# Patient Record
Sex: FEMALE | Race: WHITE | Employment: UNEMPLOYED | URBAN - METROPOLITAN AREA
[De-identification: names, ages, dates, MRNs, and addresses within clinical notes are randomized per-mention and may not be internally consistent; named-entity substitution may affect disease eponyms.]

---

## 2019-03-07 LAB
BASOPHILS # BLD: 0.8 % (ref 0.1–1.2)
BASOPHILS ABSOLUTE: 0.08 10*3/UL (ref 0.01–0.07)
EOSINOPHIL # BLD: 1.8 % (ref 0–8.1)
EOSINOPHILS ABSOLUTE: 0.18 10*3/UL (ref 0.04–0.5)
ERYTHROCYTE [DISTWIDTH] IN BLOOD BY AUTOMATED COUNT: 16.6 % (ref 12–15.4)
ERYTHROCYTE [DISTWIDTH] IN BLOOD BY AUTOMATED COUNT: 56.7 FL (ref 39.3–48.6)
HCT VFR BLD CALC: 37.9 % (ref 36.5–46.6)
HEMOGLOBIN: 11.7 G/DL (ref 11.8–15.3)
IMMATURE GRANS (ABS): 0.08 10*3/UL (ref 0–0.21)
IMMATURE GRANULOCYTES: 0.8 %
LYMPHOCYTES # BLD: 11.3 % (ref 15.7–50.5)
LYMPHOCYTES ABSOLUTE: 1.1 10*3/UL (ref 0.4–2.84)
MCH RBC QN AUTO: 28.7 PG (ref 27.5–33)
MCHC RBC AUTO-ENTMCNC: 30.9 G/DL (ref 30.1–35)
MCV RBC AUTO: 92.9 FL (ref 85.4–100)
MONOCYTES # BLD: 7.4 % (ref 4.8–12.7)
MONOCYTES ABSOLUTE: 0.72 10*3/UL (ref 0.25–0.83)
NEUTROPHILS ABSOLUTE: 7.6 10*3/UL (ref 1.95–6.85)
NEUTROPHILS: 77.9 % (ref 36.8–73.2)
NUCLEATED RBCS: 0 /100{WBCS}
PLATELET # BLD: 202 10*3/UL (ref 155–404)
PMV BLD AUTO: 11 FL (ref 9.9–12.1)
RBC: 4.08 10*6/UL (ref 3.85–5.1)
RBCS COUNTED: 0 10*3/UL
WBC: 9.8 10*3/UL (ref 4.4–9.9)

## 2020-08-18 LAB — SURGICAL PATHOLOGY REPORT: NORMAL

## 2021-12-16 ENCOUNTER — APPOINTMENT (OUTPATIENT)
Dept: CT IMAGING | Age: 81
DRG: 086 | End: 2021-12-16
Attending: STUDENT IN AN ORGANIZED HEALTH CARE EDUCATION/TRAINING PROGRAM
Payer: MEDICARE

## 2021-12-16 ENCOUNTER — HOSPITAL ENCOUNTER (INPATIENT)
Age: 81
LOS: 3 days | Discharge: HOME | DRG: 086 | End: 2021-12-19
Attending: STUDENT IN AN ORGANIZED HEALTH CARE EDUCATION/TRAINING PROGRAM | Admitting: ANESTHESIOLOGY
Payer: MEDICARE

## 2021-12-16 DIAGNOSIS — S06.5XAA SUBDURAL HEMATOMA: Primary | ICD-10-CM

## 2021-12-16 DIAGNOSIS — E11.65 CONTROLLED TYPE 2 DIABETES MELLITUS WITH HYPERGLYCEMIA, WITH LONG-TERM CURRENT USE OF INSULIN (HCC): ICD-10-CM

## 2021-12-16 DIAGNOSIS — S01.81XA LACERATION OF FOREHEAD, INITIAL ENCOUNTER: ICD-10-CM

## 2021-12-16 DIAGNOSIS — Z79.4 CONTROLLED TYPE 2 DIABETES MELLITUS WITH HYPERGLYCEMIA, WITH LONG-TERM CURRENT USE OF INSULIN (HCC): ICD-10-CM

## 2021-12-16 PROBLEM — I62.00 SUBDURAL BLEEDING (HCC): Status: ACTIVE | Noted: 2021-12-16

## 2021-12-16 LAB
ALBUMIN SERPL-MCNC: 3.5 G/DL (ref 3.5–5)
ALBUMIN/GLOB SERPL: 0.8 {RATIO} (ref 1.1–2.2)
ALP SERPL-CCNC: 125 U/L (ref 45–117)
ALT SERPL-CCNC: 41 U/L (ref 12–78)
ANION GAP SERPL CALC-SCNC: 13 MMOL/L (ref 5–15)
AST SERPL-CCNC: 38 U/L (ref 15–37)
BASOPHILS # BLD: 0.1 K/UL (ref 0–0.1)
BASOPHILS NFR BLD: 1 % (ref 0–1)
BILIRUB SERPL-MCNC: 0.4 MG/DL (ref 0.2–1)
BUN SERPL-MCNC: 58 MG/DL (ref 6–20)
BUN/CREAT SERPL: 27 (ref 12–20)
CALCIUM SERPL-MCNC: 10 MG/DL (ref 8.5–10.1)
CHLORIDE SERPL-SCNC: 99 MMOL/L (ref 97–108)
CO2 SERPL-SCNC: 26 MMOL/L (ref 21–32)
CREAT SERPL-MCNC: 2.18 MG/DL (ref 0.55–1.02)
DIFFERENTIAL METHOD BLD: ABNORMAL
EOSINOPHIL # BLD: 0.2 K/UL (ref 0–0.4)
EOSINOPHIL NFR BLD: 2 % (ref 0–7)
ERYTHROCYTE [DISTWIDTH] IN BLOOD BY AUTOMATED COUNT: 17.6 % (ref 11.5–14.5)
GLOBULIN SER CALC-MCNC: 4.2 G/DL (ref 2–4)
GLUCOSE SERPL-MCNC: 197 MG/DL (ref 65–100)
HCT VFR BLD AUTO: 41.6 % (ref 35–47)
HGB BLD-MCNC: 13.5 G/DL (ref 11.5–16)
IMM GRANULOCYTES # BLD AUTO: 0.1 K/UL (ref 0–0.04)
IMM GRANULOCYTES NFR BLD AUTO: 1 % (ref 0–0.5)
INR PPP: 1 (ref 0.9–1.1)
LYMPHOCYTES # BLD: 1.5 K/UL (ref 0.8–3.5)
LYMPHOCYTES NFR BLD: 14 % (ref 12–49)
MCH RBC QN AUTO: 29.5 PG (ref 26–34)
MCHC RBC AUTO-ENTMCNC: 32.5 G/DL (ref 30–36.5)
MCV RBC AUTO: 90.8 FL (ref 80–99)
MONOCYTES # BLD: 1.2 K/UL (ref 0–1)
MONOCYTES NFR BLD: 11 % (ref 5–13)
NEUTS SEG # BLD: 7.6 K/UL (ref 1.8–8)
NEUTS SEG NFR BLD: 71 % (ref 32–75)
NRBC # BLD: 0 K/UL (ref 0–0.01)
NRBC BLD-RTO: 0 PER 100 WBC
PLATELET # BLD AUTO: 201 K/UL (ref 150–400)
PMV BLD AUTO: 11.1 FL (ref 8.9–12.9)
POTASSIUM SERPL-SCNC: 3.1 MMOL/L (ref 3.5–5.1)
PROT SERPL-MCNC: 7.7 G/DL (ref 6.4–8.2)
PROTHROMBIN TIME: 10.3 SEC (ref 9–11.1)
RBC # BLD AUTO: 4.58 M/UL (ref 3.8–5.2)
SODIUM SERPL-SCNC: 138 MMOL/L (ref 136–145)
WBC # BLD AUTO: 10.7 K/UL (ref 3.6–11)

## 2021-12-16 PROCEDURE — 80053 COMPREHEN METABOLIC PANEL: CPT

## 2021-12-16 PROCEDURE — 74011250636 HC RX REV CODE- 250/636: Performed by: STUDENT IN AN ORGANIZED HEALTH CARE EDUCATION/TRAINING PROGRAM

## 2021-12-16 PROCEDURE — 65610000006 HC RM INTENSIVE CARE

## 2021-12-16 PROCEDURE — 85610 PROTHROMBIN TIME: CPT

## 2021-12-16 PROCEDURE — 72125 CT NECK SPINE W/O DYE: CPT

## 2021-12-16 PROCEDURE — 99285 EMERGENCY DEPT VISIT HI MDM: CPT

## 2021-12-16 PROCEDURE — 70450 CT HEAD/BRAIN W/O DYE: CPT

## 2021-12-16 PROCEDURE — 0HQ1XZZ REPAIR FACE SKIN, EXTERNAL APPROACH: ICD-10-PCS | Performed by: INTERNAL MEDICINE

## 2021-12-16 PROCEDURE — 85025 COMPLETE CBC W/AUTO DIFF WBC: CPT

## 2021-12-16 PROCEDURE — 74011000250 HC RX REV CODE- 250: Performed by: STUDENT IN AN ORGANIZED HEALTH CARE EDUCATION/TRAINING PROGRAM

## 2021-12-16 PROCEDURE — 36415 COLL VENOUS BLD VENIPUNCTURE: CPT

## 2021-12-16 PROCEDURE — 75810000293 HC SIMP/SUPERF WND  RPR

## 2021-12-16 RX ORDER — TORSEMIDE 100 MG/1
100 TABLET ORAL DAILY
COMMUNITY

## 2021-12-16 RX ORDER — ATORVASTATIN CALCIUM 40 MG/1
40 TABLET, FILM COATED ORAL DAILY
COMMUNITY

## 2021-12-16 RX ORDER — LIDOCAINE HYDROCHLORIDE AND EPINEPHRINE 20; 10 MG/ML; UG/ML
10 INJECTION, SOLUTION INFILTRATION; PERINEURAL ONCE
Status: COMPLETED | OUTPATIENT
Start: 2021-12-16 | End: 2021-12-16

## 2021-12-16 RX ORDER — HYDROCHLOROTHIAZIDE 50 MG/1
50 TABLET ORAL DAILY
COMMUNITY

## 2021-12-16 RX ORDER — DOCUSATE SODIUM 100 MG/1
100 CAPSULE, LIQUID FILLED ORAL 2 TIMES DAILY
COMMUNITY

## 2021-12-16 RX ORDER — SPIRONOLACTONE 25 MG/1
25 TABLET ORAL DAILY
COMMUNITY

## 2021-12-16 RX ORDER — SUCRALFATE 1 G/1
1 TABLET ORAL 3 TIMES DAILY
COMMUNITY

## 2021-12-16 RX ORDER — LEVOTHYROXINE SODIUM 200 UG/1
200 TABLET ORAL
COMMUNITY

## 2021-12-16 RX ORDER — GUAIFENESIN 1200 MG
TABLET, EXTENDED RELEASE 12 HR ORAL
COMMUNITY

## 2021-12-16 RX ORDER — PANTOPRAZOLE SODIUM 40 MG/1
40 TABLET, DELAYED RELEASE ORAL 2 TIMES DAILY
COMMUNITY

## 2021-12-16 RX ORDER — ASPIRIN 81 MG/1
81 TABLET ORAL DAILY
COMMUNITY
End: 2021-12-19

## 2021-12-16 RX ORDER — POTASSIUM CHLORIDE 1500 MG/1
60 TABLET, FILM COATED, EXTENDED RELEASE ORAL
COMMUNITY

## 2021-12-16 RX ORDER — LANOLIN ALCOHOL/MO/W.PET/CERES
1000 CREAM (GRAM) TOPICAL DAILY
Status: ON HOLD | COMMUNITY
End: 2021-12-17 | Stop reason: ALTCHOICE

## 2021-12-16 RX ORDER — FLAVOXATE HYDROCHLORIDE 100 MG/1
100 TABLET ORAL 3 TIMES DAILY
COMMUNITY

## 2021-12-16 RX ORDER — GABAPENTIN 300 MG/1
300 CAPSULE ORAL EVERY MORNING
COMMUNITY

## 2021-12-16 RX ADMIN — Medication 2 ML: at 20:26

## 2021-12-16 RX ADMIN — NICARDIPINE HYDROCHLORIDE 5 MG/HR: 25 INJECTION, SOLUTION INTRAVENOUS at 22:30

## 2021-12-16 RX ADMIN — LIDOCAINE HYDROCHLORIDE,EPINEPHRINE BITARTRATE 200 MG: 20; .01 INJECTION, SOLUTION INFILTRATION; PERINEURAL at 22:51

## 2021-12-17 ENCOUNTER — APPOINTMENT (OUTPATIENT)
Dept: CT IMAGING | Age: 81
DRG: 086 | End: 2021-12-17
Attending: NURSE PRACTITIONER
Payer: MEDICARE

## 2021-12-17 LAB
ANION GAP SERPL CALC-SCNC: 12 MMOL/L (ref 5–15)
ARTERIAL PATENCY WRIST A: POSITIVE
BASE EXCESS BLD CALC-SCNC: 1.5 MMOL/L
BDY SITE: ABNORMAL
BNP SERPL-MCNC: 512 PG/ML
BUN SERPL-MCNC: 57 MG/DL (ref 6–20)
BUN/CREAT SERPL: 27 (ref 12–20)
CALCIUM SERPL-MCNC: 9.4 MG/DL (ref 8.5–10.1)
CHLORIDE SERPL-SCNC: 101 MMOL/L (ref 97–108)
CO2 SERPL-SCNC: 23 MMOL/L (ref 21–32)
CREAT SERPL-MCNC: 2.08 MG/DL (ref 0.55–1.02)
EST. AVERAGE GLUCOSE BLD GHB EST-MCNC: 140 MG/DL
GAS FLOW.O2 O2 DELIVERY SYS: ABNORMAL L/MIN
GLUCOSE BLD STRIP.AUTO-MCNC: 177 MG/DL (ref 65–117)
GLUCOSE BLD STRIP.AUTO-MCNC: 190 MG/DL (ref 65–117)
GLUCOSE BLD STRIP.AUTO-MCNC: 196 MG/DL (ref 65–117)
GLUCOSE BLD STRIP.AUTO-MCNC: 212 MG/DL (ref 65–117)
GLUCOSE BLD STRIP.AUTO-MCNC: 232 MG/DL (ref 65–117)
GLUCOSE BLD STRIP.AUTO-MCNC: 272 MG/DL (ref 65–117)
GLUCOSE SERPL-MCNC: 206 MG/DL (ref 65–100)
HBA1C MFR BLD: 6.5 % (ref 4–5.6)
HCO3 BLD-SCNC: 26 MMOL/L (ref 22–26)
MAGNESIUM SERPL-MCNC: 1.9 MG/DL (ref 1.6–2.4)
PCO2 BLD: 39.7 MMHG (ref 35–45)
PH BLD: 7.42 [PH] (ref 7.35–7.45)
PHOSPHATE SERPL-MCNC: 3.9 MG/DL (ref 2.6–4.7)
PO2 BLD: 55 MMHG (ref 80–100)
POTASSIUM SERPL-SCNC: 3 MMOL/L (ref 3.5–5.1)
SAO2 % BLD: 88.8 % (ref 92–97)
SERVICE CMNT-IMP: ABNORMAL
SODIUM SERPL-SCNC: 136 MMOL/L (ref 136–145)
SPECIMEN TYPE: ABNORMAL
TROPONIN-HIGH SENSITIVITY: 21 NG/L (ref 0–51)
TSH SERPL DL<=0.05 MIU/L-ACNC: 0.43 UIU/ML (ref 0.36–3.74)

## 2021-12-17 PROCEDURE — 84443 ASSAY THYROID STIM HORMONE: CPT

## 2021-12-17 PROCEDURE — 74011250636 HC RX REV CODE- 250/636: Performed by: NURSE PRACTITIONER

## 2021-12-17 PROCEDURE — 36600 WITHDRAWAL OF ARTERIAL BLOOD: CPT

## 2021-12-17 PROCEDURE — 74011250637 HC RX REV CODE- 250/637: Performed by: INTERNAL MEDICINE

## 2021-12-17 PROCEDURE — 74011000258 HC RX REV CODE- 258: Performed by: NURSE PRACTITIONER

## 2021-12-17 PROCEDURE — 74011250636 HC RX REV CODE- 250/636: Performed by: STUDENT IN AN ORGANIZED HEALTH CARE EDUCATION/TRAINING PROGRAM

## 2021-12-17 PROCEDURE — 74011636637 HC RX REV CODE- 636/637: Performed by: NURSE PRACTITIONER

## 2021-12-17 PROCEDURE — 83036 HEMOGLOBIN GLYCOSYLATED A1C: CPT

## 2021-12-17 PROCEDURE — 82803 BLOOD GASES ANY COMBINATION: CPT

## 2021-12-17 PROCEDURE — 83880 ASSAY OF NATRIURETIC PEPTIDE: CPT

## 2021-12-17 PROCEDURE — 93005 ELECTROCARDIOGRAM TRACING: CPT

## 2021-12-17 PROCEDURE — 84100 ASSAY OF PHOSPHORUS: CPT

## 2021-12-17 PROCEDURE — 36415 COLL VENOUS BLD VENIPUNCTURE: CPT

## 2021-12-17 PROCEDURE — 83735 ASSAY OF MAGNESIUM: CPT

## 2021-12-17 PROCEDURE — 74011000250 HC RX REV CODE- 250: Performed by: NURSE PRACTITIONER

## 2021-12-17 PROCEDURE — 82962 GLUCOSE BLOOD TEST: CPT

## 2021-12-17 PROCEDURE — 84484 ASSAY OF TROPONIN QUANT: CPT

## 2021-12-17 PROCEDURE — 74011000250 HC RX REV CODE- 250: Performed by: STUDENT IN AN ORGANIZED HEALTH CARE EDUCATION/TRAINING PROGRAM

## 2021-12-17 PROCEDURE — 70450 CT HEAD/BRAIN W/O DYE: CPT

## 2021-12-17 PROCEDURE — 65660000001 HC RM ICU INTERMED STEPDOWN

## 2021-12-17 PROCEDURE — 74011250637 HC RX REV CODE- 250/637: Performed by: NURSE PRACTITIONER

## 2021-12-17 PROCEDURE — 99233 SBSQ HOSP IP/OBS HIGH 50: CPT | Performed by: CLINICAL NURSE SPECIALIST

## 2021-12-17 PROCEDURE — C9113 INJ PANTOPRAZOLE SODIUM, VIA: HCPCS | Performed by: NURSE PRACTITIONER

## 2021-12-17 PROCEDURE — 80048 BASIC METABOLIC PNL TOTAL CA: CPT

## 2021-12-17 RX ORDER — HYDROCHLOROTHIAZIDE 25 MG/1
50 TABLET ORAL DAILY
Status: DISCONTINUED | OUTPATIENT
Start: 2021-12-17 | End: 2021-12-19 | Stop reason: HOSPADM

## 2021-12-17 RX ORDER — LEVOTHYROXINE SODIUM 200 UG/1
200 TABLET ORAL
Status: DISCONTINUED | OUTPATIENT
Start: 2021-12-17 | End: 2021-12-19 | Stop reason: HOSPADM

## 2021-12-17 RX ORDER — GABAPENTIN 300 MG/1
300 CAPSULE ORAL EVERY MORNING
Status: DISCONTINUED | OUTPATIENT
Start: 2021-12-17 | End: 2021-12-19 | Stop reason: HOSPADM

## 2021-12-17 RX ORDER — INSULIN LISPRO 100 [IU]/ML
INJECTION, SOLUTION INTRAVENOUS; SUBCUTANEOUS
Status: DISCONTINUED | OUTPATIENT
Start: 2021-12-17 | End: 2021-12-18

## 2021-12-17 RX ORDER — ACETAMINOPHEN 650 MG/1
650 SUPPOSITORY RECTAL
Status: DISCONTINUED | OUTPATIENT
Start: 2021-12-17 | End: 2021-12-19 | Stop reason: HOSPADM

## 2021-12-17 RX ORDER — SUCRALFATE 1 G/1
1 TABLET ORAL 3 TIMES DAILY
Status: DISCONTINUED | OUTPATIENT
Start: 2021-12-17 | End: 2021-12-19 | Stop reason: HOSPADM

## 2021-12-17 RX ORDER — DOCUSATE SODIUM 100 MG/1
100 CAPSULE, LIQUID FILLED ORAL 2 TIMES DAILY
Status: DISCONTINUED | OUTPATIENT
Start: 2021-12-17 | End: 2021-12-19 | Stop reason: HOSPADM

## 2021-12-17 RX ORDER — SODIUM CHLORIDE 0.9 % (FLUSH) 0.9 %
5-40 SYRINGE (ML) INJECTION EVERY 8 HOURS
Status: DISCONTINUED | OUTPATIENT
Start: 2021-12-17 | End: 2021-12-19 | Stop reason: HOSPADM

## 2021-12-17 RX ORDER — INSULIN GLARGINE 100 [IU]/ML
30 INJECTION, SOLUTION SUBCUTANEOUS DAILY
Status: DISCONTINUED | OUTPATIENT
Start: 2021-12-18 | End: 2021-12-18

## 2021-12-17 RX ORDER — PANTOPRAZOLE SODIUM 40 MG/1
40 TABLET, DELAYED RELEASE ORAL
Status: DISCONTINUED | OUTPATIENT
Start: 2021-12-18 | End: 2021-12-18

## 2021-12-17 RX ORDER — INSULIN GLARGINE 100 [IU]/ML
40 INJECTION, SOLUTION SUBCUTANEOUS DAILY
Status: DISCONTINUED | OUTPATIENT
Start: 2021-12-18 | End: 2021-12-17

## 2021-12-17 RX ORDER — GABAPENTIN 600 MG/1
600 TABLET ORAL 3 TIMES DAILY
Status: DISCONTINUED | OUTPATIENT
Start: 2021-12-17 | End: 2021-12-17 | Stop reason: SDUPTHER

## 2021-12-17 RX ORDER — ACETAMINOPHEN 325 MG/1
650 TABLET ORAL
Status: DISCONTINUED | OUTPATIENT
Start: 2021-12-17 | End: 2021-12-19 | Stop reason: HOSPADM

## 2021-12-17 RX ORDER — SULFAMETHOXAZOLE AND TRIMETHOPRIM 800; 160 MG/1; MG/1
1 TABLET ORAL DAILY
COMMUNITY

## 2021-12-17 RX ORDER — DEXTROSE 50 % IN WATER (D50W) INTRAVENOUS SYRINGE
12.5-25 AS NEEDED
Status: DISCONTINUED | OUTPATIENT
Start: 2021-12-17 | End: 2021-12-19 | Stop reason: HOSPADM

## 2021-12-17 RX ORDER — POTASSIUM CHLORIDE 750 MG/1
40 TABLET, FILM COATED, EXTENDED RELEASE ORAL 2 TIMES DAILY
Status: DISCONTINUED | OUTPATIENT
Start: 2021-12-17 | End: 2021-12-18

## 2021-12-17 RX ORDER — LANOLIN ALCOHOL/MO/W.PET/CERES
1000 CREAM (GRAM) TOPICAL DAILY
Status: DISCONTINUED | OUTPATIENT
Start: 2021-12-17 | End: 2021-12-19 | Stop reason: HOSPADM

## 2021-12-17 RX ORDER — ONDANSETRON 4 MG/1
4 TABLET, ORALLY DISINTEGRATING ORAL
Status: DISCONTINUED | OUTPATIENT
Start: 2021-12-17 | End: 2021-12-19 | Stop reason: HOSPADM

## 2021-12-17 RX ORDER — DEXTROSE 50 % IN WATER (D50W) INTRAVENOUS SYRINGE
25-50 AS NEEDED
Status: DISCONTINUED | OUTPATIENT
Start: 2021-12-17 | End: 2021-12-19 | Stop reason: HOSPADM

## 2021-12-17 RX ORDER — ATORVASTATIN CALCIUM 40 MG/1
40 TABLET, FILM COATED ORAL DAILY
Status: DISCONTINUED | OUTPATIENT
Start: 2021-12-17 | End: 2021-12-19 | Stop reason: HOSPADM

## 2021-12-17 RX ORDER — LEVETIRACETAM 250 MG/1
250 TABLET ORAL EVERY 12 HOURS
Status: DISCONTINUED | OUTPATIENT
Start: 2021-12-17 | End: 2021-12-19 | Stop reason: HOSPADM

## 2021-12-17 RX ORDER — POLYETHYLENE GLYCOL 3350 17 G/17G
17 POWDER, FOR SOLUTION ORAL DAILY PRN
Status: DISCONTINUED | OUTPATIENT
Start: 2021-12-17 | End: 2021-12-19 | Stop reason: HOSPADM

## 2021-12-17 RX ORDER — ONDANSETRON 2 MG/ML
4 INJECTION INTRAMUSCULAR; INTRAVENOUS
Status: DISCONTINUED | OUTPATIENT
Start: 2021-12-17 | End: 2021-12-19 | Stop reason: HOSPADM

## 2021-12-17 RX ORDER — SODIUM CHLORIDE 0.9 % (FLUSH) 0.9 %
5-40 SYRINGE (ML) INJECTION AS NEEDED
Status: DISCONTINUED | OUTPATIENT
Start: 2021-12-17 | End: 2021-12-19 | Stop reason: HOSPADM

## 2021-12-17 RX ORDER — GABAPENTIN 600 MG/1
600 TABLET ORAL EVERY EVENING
Status: DISCONTINUED | OUTPATIENT
Start: 2021-12-17 | End: 2021-12-19 | Stop reason: HOSPADM

## 2021-12-17 RX ORDER — MAGNESIUM SULFATE 100 %
4 CRYSTALS MISCELLANEOUS AS NEEDED
Status: DISCONTINUED | OUTPATIENT
Start: 2021-12-17 | End: 2021-12-19 | Stop reason: HOSPADM

## 2021-12-17 RX ORDER — GABAPENTIN 600 MG/1
600 TABLET ORAL EVERY EVENING
COMMUNITY
End: 2021-12-19

## 2021-12-17 RX ORDER — FOLIC ACID 1 MG/1
1 TABLET ORAL DAILY
COMMUNITY

## 2021-12-17 RX ORDER — INSULIN LISPRO 100 [IU]/ML
INJECTION, SOLUTION INTRAVENOUS; SUBCUTANEOUS EVERY 6 HOURS
Status: DISCONTINUED | OUTPATIENT
Start: 2021-12-17 | End: 2021-12-17

## 2021-12-17 RX ADMIN — Medication 10 ML: at 06:34

## 2021-12-17 RX ADMIN — GABAPENTIN 300 MG: 300 CAPSULE ORAL at 11:58

## 2021-12-17 RX ADMIN — Medication 10 ML: at 21:03

## 2021-12-17 RX ADMIN — POTASSIUM CHLORIDE 40 MEQ: 750 TABLET, FILM COATED, EXTENDED RELEASE ORAL at 21:02

## 2021-12-17 RX ADMIN — GABAPENTIN 600 MG: 600 TABLET, FILM COATED ORAL at 21:02

## 2021-12-17 RX ADMIN — Medication 3 UNITS: at 03:54

## 2021-12-17 RX ADMIN — Medication 2 UNITS: at 11:57

## 2021-12-17 RX ADMIN — Medication 10 ML: at 13:37

## 2021-12-17 RX ADMIN — LEVETIRACETAM 250 MG: 250 TABLET, FILM COATED ORAL at 17:19

## 2021-12-17 RX ADMIN — SODIUM CHLORIDE 40 MG: 9 INJECTION INTRAMUSCULAR; INTRAVENOUS; SUBCUTANEOUS at 03:00

## 2021-12-17 RX ADMIN — Medication 2 UNITS: at 06:41

## 2021-12-17 RX ADMIN — HYDROCHLOROTHIAZIDE 50 MG: 25 TABLET ORAL at 13:41

## 2021-12-17 RX ADMIN — Medication 10 ML: at 04:02

## 2021-12-17 RX ADMIN — SUCRALFATE 1 G: 1 TABLET ORAL at 21:01

## 2021-12-17 RX ADMIN — Medication 2 UNITS: at 21:08

## 2021-12-17 RX ADMIN — NICARDIPINE HYDROCHLORIDE 5 MG/HR: 25 INJECTION, SOLUTION INTRAVENOUS at 09:00

## 2021-12-17 RX ADMIN — ATORVASTATIN CALCIUM 40 MG: 40 TABLET, FILM COATED ORAL at 21:10

## 2021-12-17 RX ADMIN — DOCUSATE SODIUM 100 MG: 100 CAPSULE ORAL at 17:18

## 2021-12-17 RX ADMIN — POTASSIUM CHLORIDE 40 MEQ: 750 TABLET, FILM COATED, EXTENDED RELEASE ORAL at 13:41

## 2021-12-17 RX ADMIN — SUCRALFATE 1 G: 1 TABLET ORAL at 13:41

## 2021-12-17 RX ADMIN — NICARDIPINE HYDROCHLORIDE 5 MG/HR: 25 INJECTION, SOLUTION INTRAVENOUS at 04:24

## 2021-12-17 RX ADMIN — LEVOTHYROXINE SODIUM 200 MCG: 0.2 TABLET ORAL at 08:09

## 2021-12-17 RX ADMIN — LEVETIRACETAM 250 MG: 100 INJECTION, SOLUTION INTRAVENOUS at 05:03

## 2021-12-17 NOTE — ED TRIAGE NOTES
Patient arrives via EMS for a fall. Pt states she was walking across the floor when \"her shoes got stuck to the floor\" causing her to fall forward.  Laceration above right ey,  Bruising and swelling to right knee

## 2021-12-17 NOTE — H&P
[facility]    ICU TEAM History and Physical    Name: Teja Tay   : 1940   MRN: [id number]   Date: 2021      Progress Note: 2021      Reason for ICU Admission:  Subdural Hematoma     HPI:  This is a 47-year-old female patient with past medical history significant for atrial fibrillation status post ablation, diabetes, hypertension, COPD not on PPV, CKD, hypothyroidism, coronary artery disease, CABG, multiple orthopedic surgeries to include bilateral shoulder repair, bilateral hip and knee replacements, colon cancer, renal cancer status post left nephrectomy. Patient presented to short [facility] ED last evening after a witnessed mechanical fall. Per patient she was visiting a relative and walked across the kitchen when she tripped on her shoe and fell to the ground. She did not have LOC but complains of pain over the right side of her forehead. She was seen at [facility] where she received 11 sutures. Head CT scan done showed acute left parafalcine subdural hematoma without significant mass-effect. Neurosurgery was alerted and referred patient to be admitted to ICU with monitoring. CT C-spine was negative for acute findings. Patient arrived in ICU alert oriented x4. She is on a nicardipine drip to keep systolic blood pressures less than 140. She denies chest pain or pressure, headaches, palpitations, nausea or vomiting, fevers or chills, shortness of breath, abdominal pain, diarrhea or constipation. She has not had any recent falls and does not complain of any dizziness. Assessment:     ICU Problems:    1. Acute left parafalcine subdural hematoma  2. Atrial fibrillation status post ablation  3. Hypertension  4. Diabetes mellitus  5. Hypothyroidism  6. Chronic Kidney Disease     ICU Comprehensive Plan of Care:     Plans for this Shift:     1.  Neuro:   -Neurochecks every hour  Follow-up head CT scan  Neurosurgery consulted  Fall/seizure precautions  - IV Keppra BID Keep systolic blood pressures less than 140 mmHg  PT/OT      2. Pulm:   -No acute findings  Currently on room air  Keep oxygen saturation greater than 92%    3. Cardiac:  -Hold home aspirin in setting of subdural hematoma  Telemetry  EKG now   - Continue nicardipine gtt for SBP <140    4. Renal:  -Strict intake and output  - Cruz catheter with a leg bag, obtain UA    5. GI:   - Protonix for GERD   - Carafate  QID  - Passed bedside swallow     6. ID:   - No acute issues , Add UA     7. Endo:   - SSI Q 6 hours   - Verify  home insulin regimen   - Restart home levothyroxine   - Add TSH     8. Heme:  - Follow blood counts   - Continue home cyanocobalamin  daily         7. SBP Goal of: < 140 mmHg  8. Nicardipine (Cardene) - For above SBP/MAP goals  9. IVFs:  None   10. Transfusion Trigger (Hgb): <7 g/dL  11. Respiratory Goals:  a. Head of bed > 30 degrees  b. Aggressive bronchopulmonary hygiene  c. Incentive spirometry  12. Pulmonary toilet: Incentive Spirometry   13. SpO2 Goal: > 92%  14. Keep K>4; Mg>2   15. PT/OT: PT consulted and on board and OT consulted and on board   16. Discussed Plan of Care/Code Status: Do Not Resuscitate  17. Appreciate Consultants Input   18. Discussed Care Plan with Bedside RN  19. Documentation of Current Medications  20.  Rest of Plan Below:    F - Feeding:  N/A   A - Analgesia: Acetaminophen  S - Sedation: None  T - DVT Prophylaxis: SCD's or Sequential Compression Device   H - Head of Bed: > 30 Degrees  U - Ulcer Prophylaxis: Protonix (pantoprazole)   G - Glycemic Control: Insulin  S - Spontaneous Breathing Trial: N/A  B - Bowel Regimen: Senna and MiraLax  I - Indwelling Catheter:   Tubes: None  Lines: Peripheral IV  Drains: Cruz Catheter  D - De-escalation of Antibiotics:None     Active Problem List:     Problem List  Never Reviewed          Codes Class    Subdural bleeding (HCC) ICD-10-CM: I62.00  ICD-9-CM: 432.1               Past Medical History:      has a past medical history of A-fib ([facility] [address] Diabetes ([facility] [address] and HTN (hypertension). Past Surgical History:      has a past surgical history that includes hx shoulder replacement (Bilateral); hx partial colectomy; hx coronary artery bypass graft; hx lumbar laminectomy; hx nephrectomy (Left); hx knee replacement (Bilateral); hx afib ablation; hx angioplasty; hx cataract removal; hx rotator cuff repair (Bilateral); hx hip replacement (Bilateral); hx bladder suspension; ir decompress lumbar disc perc; hx hysterectomy; hx carpal tunnel release; hx tonsillectomy; and pr removal with insertion of suprapubic catheter. Home Medications:     Prior to Admission medications    Medication Sig Start Date End Date Taking? Authorizing Provider   acetaminophen (TylenoL) 325 mg cap Take  by mouth. Yes Sergey, MD Elias   aspirin delayed-release 81 mg tablet Take 81 mg by mouth daily. Yes Sergey, MD Elias   docusate sodium (Colace) 100 mg capsule Take 100 mg by mouth two (2) times a day. Yes Sergey, MD Elias   Ranelle Dust [id number] mg-mg-million tab Take  by mouth. Yes Sergey, MD Elias   flavoxate HCl (FLAVOXATE PO) Take  by mouth. Yes Elias Rincon MD   gabapentin (NEURONTIN) 600 mg tablet Take 600 mg by mouth three (3) times daily. Yes Elias Rincon MD   insulin lispro (HUMALOG) 100 unit/mL kwikpen by SubCUTAneous route. Yes Elias Rincon MD   hydroCHLOROthiazide (HYDRODIURIL) 50 mg tablet Take 50 mg by mouth daily. Yes Elias Rincon MD   levothyroxine (SYNTHROID) 200 mcg tablet Take  by mouth Daily (before breakfast). Yes Sergey, MD Elias   insulin detemir U-100 (LEVEMIR) 100 unit/mL injection by SubCUTAneous route nightly. Yes Elias Rincon MD   atorvastatin (Lipitor) 40 mg tablet Take 40 mg by mouth daily. Yes Elias Rincon MD   pantoprazole (PROTONIX) 40 mg tablet Take 40 mg by mouth daily. Yes Sergey, MD Elias   potassium chloride SA (MICRO-K) 10 mEq capsule Take 20 mEq by mouth two (2) times a day.    Yes Elias Rincon MD spironolactone (ALDACTONE) 25 mg tablet Take  by mouth daily. Yes Sergey, MD Elias   sucralfate (CARAFATE) 1 gram tablet Take  by mouth four (4) times daily. Yes Sergey, MD Elias   torsemide (DEMADEX) 20 mg tablet Take 20 mg by mouth daily. Yes Sergey, MD Elias   cyanocobalamin 1,000 mcg tablet Take 1,000 mcg by mouth daily. Yes Sergey, MD Elias   ferric gluconate (FERRLECIT) infusion by IntraVENous route once. Yes Sergey, MD Elias       Allergies/Social/Family History: Allergies   Allergen Reactions    Codeine Nausea and Vomiting    Morphine Nausea and Vomiting    Percocet [Oxycodone-Acetaminophen] Nausea and Vomiting    Tramadol Nausea and Vomiting      Social History     Tobacco Use    Smoking status: Not on file    Smokeless tobacco: Not on file   Substance Use Topics    Alcohol use: Not on file      History reviewed. No pertinent family history.     Review of Systems:     ROS negative except what's mentioned in the HPI     Objective:   Vital Signs:  Visit Vitals  /68   Pulse (!) 101   Temp 98.6 °F (37 °C)   Resp 19   Wt 101.5 kg (223 lb 12.3 oz)   SpO2 93%      O2 Device: None (Room air) Temp (24hrs), Av.3 °F (36.8 °C), Min:98 °F (36.7 °C), Max:98.6 °F (37 °C)           Intake/Output:   No intake or output data in the 24 hours ending 21 0249    Physical Exam:  General: Elderly female patient lying in bed right supraorbital ecchymosis with laceration and sutures present  HEENT: Head normocephalic, right-sided abrasion and ecchymosis, oral mucosa pink and moist, no oral lesions, trachea midline,  Pulmonary: Lungs clear to auscultation bilaterally with diminished bases  CV: Heart rate and rhythm irregularly irregular, could not hear murmurs rubs or gallops  Abdomen: Soft, nontender nondistended, obese, positive bowel sounds  : Cruz catheter present with leg bag with clear yellow urine  Extremities: No clubbing cyanosis or edema  Neuro: Awake alert oriented x4, nonfocal      LABS AND  DATA: Personally reviewed  Recent Labs     12/16/21 2122   WBC 10.7   HGB 13.5   HCT 41.6        Recent Labs     12/17/21  0134 12/16/21 2122   NA  --  138   K  --  3.1*   CL  --  99   CO2  --  26   BUN  --  58*   CREA  --  2.18*   GLU  --  197*   CA  --  10.0   MG 1.9  --    PHOS 3.9  --      Recent Labs     12/16/21 2122   *   TP 7.7   ALB 3.5   GLOB 4.2*     Recent Labs     12/16/21 2122   INR 1.0   PTP 10.3      No results for input(s): PHI, PCO2I, PO2I, FIO2I in the last 72 hours. No results for input(s): CPK, CKMB, TROIQ, BNPP in the last 72 hours. Hemodynamics:   PAP:   CO:     Wedge:   CI:     CVP:    SVR:       PVR:       Ventilator Settings:  Mode Rate Tidal Volume Pressure FiO2 PEEP                    Peak airway pressure:      Minute ventilation:          MEDS: Reviewed    Imaging:      CXR Results  (Last 48 hours)    None          CT Results  (Last 48 hours)               12/16/21 2045  CT HEAD WO CONT Final result    Impression:  Acute left parafalcine subdural hematoma without significant mass effect at this   time. This result was verbally relayed by me to Dr. Meera Ashley at 2054 hours       789       Narrative:  EXAM: CT HEAD WO CONT       INDICATION: Headache after head injury       COMPARISON: None. CONTRAST: None. TECHNIQUE: Unenhanced CT of the head was performed using 5 mm images. Brain and   bone windows were generated. Coronal and sagittal reformats. CT dose reduction   was achieved through use of a standardized protocol tailored for this   examination and automatic exposure control for dose modulation. FINDINGS:   The ventricles and sulci are normal in size, shape and configuration. . There is   mild periventricular white matter hypodensity. . Along the left frontal falx,   there is a parafalcine 1.8 x 0.6 x 3.9 cm hematoma. This does not result in   significant midline shift. The periorbital soft tissue swelling is seen.  The   basilar cisterns are open. No CT evidence of acute infarct. Heavy left vertebral   artery calcification is present. The bone windows demonstrate no abnormalities. The visualized portions of the   paranasal sinuses and mastoid air cells are clear. 12/16/21 2045  CT SPINE CERV WO CONT Final result    Impression:      Multifactorial central canal stenosis C2-3 through C6-7   Severe left C3-4, bilateral C4-5, bilateral C5-6, left C6-7 neural foraminal   stenosis   Moderate severe right C3-4 neural foraminal stenosis   No fracture       Narrative:  EXAM:  CT CERVICAL SPINE WITHOUT CONTRAST       INDICATION: head injury. COMPARISON: None. CONTRAST:  None. TECHNIQUE: Multislice helical CT of the cervical spine was performed without   intravenous contrast administration. Sagittal and coronal reformats were   generated. CT dose reduction was achieved through use of a standardized   protocol tailored for this examination and automatic exposure control for dose   modulation. FINDINGS:       2 mm nodule at the left apex (series 2, image 283). The alignment is remarkable for 2 mm anterolisthesis of C4 relative to C5. Blanchie Bevels There is no fracture or compression deformity. The odontoid process is intact. The craniocervical junction is within normal limits. The incidentally imaged soft tissues are within normal limits. C2-C3: Disc osteophyte complex. Central canal measures 7.2 mm anterior to   posterior. Mild left-sided neural foraminal stenosis secondary to facet   arthropathy (series 3, image 29). C3-C4: Disc osteophyte complex, asymmetric to the left. Central canal measures   7.1 mm anterior to posterior. Moderate severe right and severe left neural   foraminal stenosis secondary to uncovertebral joint hypertrophy (series 3, image   85). Blanchie Bevels C4-C5: Disc osteophyte complex. Bilateral facet arthropathy and uncovertebral   joint hypertrophy.  Central canal measures 7.8 mm anterior to posterior. Severe   bilateral neural foraminal stenosis (series 3, image 39). Bryan Mary C5-C6: Disc osteophyte complex. Central canal measures 8 mm anterior to   posterior. Severe bilateral osseous neural foraminal stenosis (series 3, image   45). Bryan Mary C6-C7: Disc osteophyte complex, asymmetric to the left. Central canal measures   8.5 mm anterior to posterior. Severe left-sided neural foraminal stenosis   secondary to uncovertebral joint hypertrophy. (Series 3, image 49). .       C7-T1: Mild bilateral neural foraminal stenosis secondary to uncovertebral joint   hypertrophy (series 3, image 53). Bryan Mary ECHO:  Pending     Multidisciplinary Rounds Completed:  Pending    ABCDEF Bundle/Checklist Completed:  Yes    SPECIAL EQUIPMENT  None    DISPOSITION  Stay in ICU    CRITICAL CARE CONSULTANT NOTE  I had a face to face encounter with the patient, reviewed and interpreted patient data including clinical events, labs, images, vital signs, I/O's, and examined patient. I have discussed the case and the plan and management of the patient's care with the consulting services, the bedside nurses and the respiratory therapist.      NOTE OF PERSONAL INVOLVEMENT IN CARE   This patient has a high probability of imminent, clinically significant deterioration, which requires the highest level of preparedness to intervene urgently. I participated in the decision-making and personally managed or directed the management of the following life and organ supporting interventions that required my frequent assessment to treat or prevent imminent deterioration. I personally spent 69  minutes of critical care time. This is time spent at this critically ill patient's bedside actively involved in patient care as well as the coordination of care and discussions with the patient's family. This does not include any procedural time which has been billed separately.     Diana Daly  FNP, [facility]     Critical Care Medicine  [facility]

## 2021-12-17 NOTE — CONSULTS
Neurosurgery  Pt seen and examined, full consult to follow. Mechanical fall. No LOC  Episode of confusion last night, resolved    CT shows parafalcine hematoma  Repeat slightly bigger, but still small    Can go to step down.    Will repeat head ct tomorrow    If stable can go

## 2021-12-17 NOTE — PROGRESS NOTES
[address] December 17, 2021       RE: Teja Tay      To Whom It May Concern,    This is to certify that Teja Tay was hospitalized 12/16/21 with possible discharge on 12/18/21. She was diagnosed with a subdural hematoma and is unable to fly for the next month. Please feel free to contact Dr. [facility] office at [phone] if you have any questions or concerns. Thank you for your assistance in this matter.       Sincerely,        Nicolasa Jeffers NP

## 2021-12-17 NOTE — PROGRESS NOTES
0730 Bedside and Verbal shift change report given to Julia Gonzalez, RN and Carmina Lee, RN (oncoming nurse) by LIZETH Mills RN (offgoing nurse). Report included the following information SBAR, Kardex, ED Summary, Intake/Output, MAR, Accordion, Recent Results, Med Rec Status, Cardiac Rhythm a fib, Alarm Parameters  and Dual Neuro Assessment.      0900 Verbal orders for diet and Q1 hour neuro checks by Beatriz Lennox, MD    0945 Q 4 hour neuro checks per Hari Nava MD

## 2021-12-17 NOTE — PROGRESS NOTES
Transitions of Care:    RUR- 15% moderate    Dispo: Likely to relatives home pending medical progression    Follow-up: PCP/Specialist at home in [facility]    Reason for Admission:   Subdural hematoma                    RUR Score:     15%             PCP: First and Last name:   Bakari, Not On File, CLAUDETTE Hernandez MD ph# [phone]     Name of Practice:    Are you a current patient: Yes/No: yes   Approximate date of last visit: within last 6 months   Can you participate in a virtual visit if needed: no    Do you (patient/family) have any concerns for transition/discharge? None at this time               Plan for utilizing home health:   TBD, ?possible need for PT eval r/t fall    Current Advanced Directive/Advance Care Plan:  DNR      Healthcare Decision Maker:   Click here to complete [address] including selection of the Healthcare Decision Maker Relationship (ie \"Primary\")             is primary decision maker and he is at home in [facility]    Daughter traveling with patient and is point of contact - Joann Cox [phone]    Transition of Care Plan:          CM met with patient - confirmed demographics and insurance on file- is visiting her 2 sisters who live locally and her daughter is currently staying with them - patient owns a cane, walker and rollator and uses rollator mostly in the morning when she needs it- been  to her  for 72 years and he is currently at home in [facility] - patient needs minimal assistance with showers and dressing due to 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 Will await further reccommendations for any disposition needs at this time. Medicare pt has received, reviewed, and signed 1st IM letter informing them of their right to appeal the discharge.   Signed copied has been placed on pt bedside chart. JAY Hogan     Care Management Interventions  PCP Verified by CM:  Yes (within last 6 months)  MyChart Signup: No  Discharge Durable Medical Equipment: No  Physical Therapy Consult: No  Occupational Therapy Consult: No  Speech Therapy Consult: No  Support Systems: Spouse/Significant Other,Child(orlando),Other Family Member(s)  Confirm Follow Up Transport: Family  Discharge Location  Discharge Placement: Home

## 2021-12-17 NOTE — PROGRESS NOTES
2322 - TRANSFER - IN REPORT:    Verbal report received from April RN(name) on Leo Germain  being received from [facility](unit) for routine progression of care      Report consisted of patients Situation, Background, Assessment and   Recommendations(SBAR). Information from the following report(s) SBAR, Kardex, ED Summary, Procedure Summary, Intake/Output, MAR, Recent Results, Cardiac Rhythm Afib/NSR and Alarm Parameters  was reviewed with the receiving nurse. Opportunity for questions and clarification was provided. Assessment completed upon patients arrival to unit and care assumed. 0230 - Pt exhibited confusion/irritability/forgetfulness. NP Mendoza/NP Grive at bedside. Code stroke called . Dr. Dianna Fernandez notified orders received for CT.     1930 - Bedside and Verbal shift change report given to Nayeli TAVAREZ (oncoming nurse) by Lucinda Hernandez RN (offgoing nurse). Report included the following information SBAR, Kardex, ED Summary, Procedure Summary, Intake/Output, MAR, Recent Results, Cardiac Rhythm Afib, Alarm Parameters  and Dual Neuro Assessment.

## 2021-12-17 NOTE — PROGRESS NOTES
TRANSFER - OUT REPORT:    Verbal report given to CORBY Mills on Ele Rea  being transferred to  for routine progression of care       Report consisted of patients Situation, Background, Assessment and   Recommendations(SBAR). Information from the following report(s) SBAR, ED Summary, Intake/Output, MAR, Recent Results and Cardiac Rhythm afib- nsr was reviewed with the receiving nurse. Lines:   Peripheral IV 12/16/21 Right;Upper Arm (Active)   Site Assessment Clean, dry, & intact 12/17/21 1200   Phlebitis Assessment 0 12/17/21 1200   Infiltration Assessment 0 12/17/21 1200   Dressing Status Clean, dry, & intact 12/17/21 1200   Dressing Type Tape;Transparent 12/17/21 1200   Hub Color/Line Status Pink; Infusing 12/17/21 1200   Action Taken Open ports on tubing capped 12/17/21 1200   Alcohol Cap Used Yes 12/17/21 1200       Peripheral IV 12/17/21 Anterior;Right Hand (Active)   Site Assessment Clean, dry, & intact 12/17/21 1200   Phlebitis Assessment 0 12/17/21 1200   Infiltration Assessment 0 12/17/21 1200   Dressing Status Clean, dry, & intact 12/17/21 1200   Dressing Type Tape;Transparent 12/17/21 1200   Hub Color/Line Status Pink;Patent 12/17/21 1200   Action Taken Open ports on tubing capped 12/17/21 1200   Alcohol Cap Used Yes 12/17/21 1200        Opportunity for questions and clarification was provided.       Patient transported with:   Registered Nurse  Tech

## 2021-12-17 NOTE — DIABETES MGMT
[address] [facility]    CLINICAL NURSE SPECIALIST CONSULT     Initial Presentation   Henna Cook is a 78 y.o. female admitted s/p fall. C/o of pain right side of head after fall with sutures placed on head. CT scan- acute left parafalcine subdural hematoma . CT C-spine negative. LOC A & 0 x4. HX:   Past Medical History:   Diagnosis Date    A-fib ([address]     Diabetes ([facility] [facility]     HTN (hypertension)         INITIAL DX:   Subdural bleeding ([facility] [address] [I62.00]     Current Treatment     TX: Nicardipine infusion for BP control/ Keppra IV    Consulted by Provider for advanced diabetes nursing assessment and care for:   [x] Inpatient management strategy      Hospital Course   Clinical progress has been complicated by subdural hematoma- medically treating and watching. Diabetes History   DM2- A1c 6.5%- PCP      Diabetes-related Medical History  Acute complications  NONE  Neurological complications  NONE  Microvascular disease  NONE  Macrovascular disease  NONE  Other associated conditions     HTN    Diabetes Medication History  Key Antihyperglycemic Medications             insulin lispro 200 unit/mL (3 mL) inpn (Taking) 40 Units by SubCUTAneous route. 40 units in the morning, 40 units every noon, and 50 units at dinner    insulin detemir U-100 (LEVEMIR) 100 unit/mL injection (Taking) 80 Units by SubCUTAneous route nightly. Diabetes self-management practices: deferred; patient was sleeping at time of visit. Eating pattern   []  Physical activity pattern     Monitoring pattern     Taking medications pattern    Overall evaluation:    [x] Achieving A1c target with drug therapy & self-care practices    Subjective   Patient sleeping at time of visit. Objective   Physical exam  General Obese female  in no acute distress.  Sleeping  Neuro  Not assessed  Vital Signs   Visit Vitals  BP (!) 152/70   Pulse 87   Temp 98 °F (36.7 °C)   Resp 18   Ht 5' 2\" (1.575 m)   Wt 98.5 kg (217 lb 2.5 oz)   SpO2 95%   BMI 39.72 kg/m²     Skin  Warm and dry. Diabetic foot exam: takes gabapentin- +peripheral neuropathy        Laboratory  Recent Labs     12/17/21  0134 12/16/21  2122   * 197*   AGAP 12 13   WBC  --  10.7   CREA 2.08* 2.18*   GFRNA 23* 22*   AST  --  38*   ALT  --  41       Factors impacting BG management  Factor Dose Comments   Nutrition:  Standard meals     60 grams/meal      Drugs:    Other: BP managment     Nicardipine infusion      Other:   Kidney function  Liver function     GFR-22          Blood glucose pattern      Significant diabetes-related events over the past 24-72 hours  Admitting -212  Correctional insulin ordered-   BG trends: 190-212    Assessment and Plan   Nursing Diagnosis Risk for unstable blood glucose pattern   Nursing Intervention Domain 5250 Decision-making Support   Nursing Interventions Examined current inpatient diabetes/blood glucose control   Explored factors facilitating and impeding inpatient management  Explored corrective strategies with patient and responsible inpatient provider   Informed patient of rational for insulin strategy while hospitalized     Nursing Diagnosis 42476 Ineffective Health Management   Nursing Intervention Domain 5250 Decision-makingSupport   Nursing Interventions Identified diabetes self-management practices impeding diabetes control  Discussed diabetes survival skills related to  1. Healthy Plate eating plan; given handouts  2. Role of physical activity in improving insulin sensitivity and action  3. Procedure for blood glucose monitoring & options for ow-cost products available from [facility]   4. Medications plan at discharge     Evaluation   This  female with controlled Type 2 diabetes, did  achieve diabetes control prior to admission, as evidenced by A1c of 6.5%. Currently admitted s/p fall while visiting a friend.   Sustained a subdural hematoma and was initially admitted to ICU,now downgraded to NSTU - no surgical intervention thus far. Per neuro notes, no neurologic deficits noted. Re: diabetes management, she is on high dose PTA insulin regimen -  Since admission BG trends have been 177-212. Would recommend initiating basal/bolus (when tolerating food diet >50%) and correction insulin regimen to keep -180. Given advanced age A1C is at goal and is at high risk for hypoglycemia give the amount of insulins she is taking PTA. The Subcutaneous Insulin Order set (4140) has not been in use. Hence, the next step in optimizing blood glucose control would be    [x] Implement the Subcutaneous Insulin order set  [x]  Optimize basal insulin dosing   [x]  Add mealtime insulin- when appropriate    Recommendations     [x] Use of Subcutaneous Insulin Order set (1935)  Insulin Dosing Specific recommendation   START Basal                                      (Based on weight, BMI & GFR) [x] 0.4 units/kg/D=40 units Lantus daily - Start tonight  This is 50% reduction in home basal dosing. IF /WHEN BG trend >200 despite basal insulin, ADD pre meal Nutritional                                      (Based on CHO/dextrose load) [x] Normal sensitivity  10 units Humalog TID    CONTINUE Corrective                                       (Useful in adjusting insulin dosing) [x] Normal sensitivity          Billing Code(s)   [x] 26278 IP subsequent hospital care - 35 minutes    Before making these care recommendations, I personally reviewed the hospitalization record, including notes, laboratory & diagnostic data and current medications, and examined the patient at the bedside (circumstances permitting) before making care recommendations.      Total minutes: [address] JASON Molina  Diabetes Clinical Nurse Specialist  [facility]  Access via [facility]

## 2021-12-17 NOTE — PROGRESS NOTES
[facility] Adult  Hospitalist Group     ICU Transfer/Accept Summary     This [place] is being [place] [address] [place]  DATE OF TRANSFER: 12/17/2021       PATIENT ID: Gasper Desai  MRN: [id number]   YOB: 1940    PRIMARY CARE PROVIDER: Adela Townsend, Not On File, FNP-C   DATE OF ADMISSION: 12/16/2021  7:46 PM    ATTENDING PHYSICIAN: KAMRAN Valdez  CONSULTATIONS:   IP CONSULT TO INTENSIVIST  IP CONSULT TO NEUROSURGERY    PROCEDURES/SURGERIES:   * No surgery found *    REASON FOR ADMISSION: <principal problem not specified>     HOSPITAL PROBLEM LIST:  Patient Active Problem List   Diagnosis Code    Subdural bleeding ([facility] [address] I62.00         Brief HPI and Hospital Course:    78-year-old female patient with past medical history significant for atrial fibrillation status post ablation, diabetes, hypertension, COPD not on PPV, CKD, hypothyroidism, coronary artery disease, CABG, multiple orthopedic surgeries to include bilateral shoulder repair, bilateral hip and knee replacements, colon cancer, renal cancer status post left nephrectomy. Patient presented to short [facility] ED last evening after a witnessed mechanical fall. Per patient she was visiting a relative and walked across the kitchen when she tripped on her shoe and fell to the ground. She did not have LOC but complains of pain over the right side of her forehead. She was seen at [facility] where she received 11 sutures. Head CT scan done showed acute left parafalcine subdural hematoma without significant mass-effect. Neurosurgery was alerted and referred patient to be admitted to ICU with monitoring. CT C-spine was negative for acute findings. Patient arrived in ICU alert oriented x4. She is on a nicardipine drip to keep systolic blood pressures less than 140.     Assessment and Plan:    # Acute left parafalcine subdural hematoma     - Neuro surgery following     - Continue IV Keppra     - Continue Cardene gtt, keep SBP less than 140     - Continue fall/seizure precautions     - PT/OT eval and treat     - Follow-up CT head in am  # A.fib s/p ablation  # HTN     - Continue cardene gtt, Keep SBP less than 140     - Hold asa for now     - Continue Atorvastatin  # DM II     - SSI     - Accu-checks AC/HS     - Lantus 30 mg daily     - Diabetes Management consulted, per patient she takes 30 units of humalog BID and 80 units of Lantus HS. Will defer to diabetes management for recoomendations. # Hypothroidism     - Continue levothroxine  # CKD      - Creatinine 2.18 on admission, baseline unknown     - Daily BMP, continue to monitor               PHYSICAL EXAMINATION:  Visit Vitals  BP (!) 120/43 (BP 1 Location: Left upper arm, BP Patient Position: At rest)   Pulse 91   Temp 98 °F (36.7 °C)   Resp 22   Ht 5' 2\" (1.575 m)   Wt 98.5 kg (217 lb 2.5 oz)   SpO2 98%   BMI 39.72 kg/m²       General:          Alert, cooperative, no distress  HEENT:           Atraumatic, MMM            Neck:               Supple, symmetrical,  thyroid: non tender  Lungs:             Clear to auscultation bilaterally. No Wheezing or Rhonchi. No rales. Heart:              Regular  rhythm,  No  murmur   No edema  Abdomen:       Soft, non-tender. Not distended. Bowel sounds normal  Extremities:     No cyanosis. No clubbing,  +2 distal pulses  Skin:                Not pale. Not Jaundiced  No rashes   Psych:             Not anxious or agitated. Neurologic:      Alert, moves all extremities, oriented X 3.      Labs:     Recent Labs     12/16/21 2122   WBC 10.7   HGB 13.5   HCT 41.6        Recent Labs     12/17/21  0134 12/16/21 2122   NA  --  138   K  --  3.1*   CL  --  99   CO2  --  26   BUN  --  58*   CREA  --  2.18*   GLU  --  197*   CA  --  10.0   MG 1.9  --    PHOS 3.9  --      Recent Labs     12/16/21 2122   ALT 41   *   TBILI 0.4   TP 7.7   ALB 3.5   GLOB 4.2*     Recent Labs     12/16/21 2122   INR 1.0   PTP 10.3      No results for input(s): FE, TIBC, PSAT, FERR in the last 72 hours. No results found for: FOL, RBCF   No results for input(s): PH, PCO2, PO2 in the last 72 hours. No results for input(s): CPK, CKNDX, TROIQ in the last 72 hours.     No lab exists for component: CPKMB  No results found for: CHOL, CHOLX, CHLST, CHOLV, HDL, HDLP, LDL, LDLC, DLDLP, TGLX, TRIGL, TRIGP, CHHD, CHHDX  Lab Results   Component Value Date/Time    Glucose (POC) 190 (H) 12/17/2021 11:53 AM    Glucose (POC) 196 (H) 12/17/2021 06:38 AM    Glucose (POC) 212 (H) 12/17/2021 03:51 AM    Glucose (POC) 232 (H) 12/17/2021 02:41 AM     No results found for: COLOR, APPRN, SPGRU, REFSG, JASON, PROTU, GLUCU, KETU, BILU, UROU, ARABELLA, LEUKU, GLUKE, EPSU, BACTU, WBCU, RBCU, CASTS, UCRY      CODE STATUS:   Full Code   X DNR    Partial    Comfort Care       Signed:   KAMRAN Townsend  Date of Service:  12/17/2021  12:24 PM

## 2021-12-17 NOTE — ED NOTES
TRANSFER - OUT REPORT:    Verbal report given to Molly Mosquera RN(name) on Aditya Macedo  being transferred to [address] [place] [address](unit) for urgent transfer       Report consisted of patients Situation, Background, Assessment and   Recommendations(SBAR). Information from the following report(s) SBAR, ED Summary, MAR, Recent Results and Cardiac Rhythm sinus was reviewed with the receiving nurse. Lines:   Peripheral IV 12/16/21 Right;Upper Arm (Active)   Site Assessment Clean, dry, & intact 12/16/21 2120   Phlebitis Assessment 0 12/16/21 2120   Infiltration Assessment 0 12/16/21 2120   Dressing Status Clean, dry, & intact 12/16/21 2120        Opportunity for questions and clarification was provided.       Patient transported with:   Monitor

## 2021-12-17 NOTE — ED PROVIDER NOTES
78-year-old woman presenting after she struck her head after mechanical fall. She states that she was walking across the kitchen of her relative home she is visiting and her shoe got stuck to the floor, causing her to fall forward. No report of loss of consciousness. Complaining of pain over the right side of her forehead. Denies vomiting or diarrhea. No focal weakness, aphasia, dysarthria, diplopia. Past Medical History:   Diagnosis Date    [facility] ([facility]     Diabetes ([facility] [address]     HTN (hypertension)        Past Surgical History:   Procedure Laterality Date    HX AFIB ABLATION      HX ANGIOPLASTY      HX BLADDER SUSPENSION      HX CARPAL TUNNEL RELEASE      HX CATARACT REMOVAL      HX CORONARY ARTERY BYPASS GRAFT      HX HIP REPLACEMENT Bilateral     HX HYSTERECTOMY      HX KNEE REPLACEMENT Bilateral     HX LUMBAR LAMINECTOMY      HX NEPHRECTOMY Left     HX PARTIAL COLECTOMY      HX ROTATOR CUFF REPAIR Bilateral     HX SHOULDER REPLACEMENT Bilateral     HX TONSILLECTOMY      IR DECOMPRESS LUMBAR DISC PERC      CT REMOVAL WITH INSERTION OF SUPRAPUBIC CATHETER           History reviewed. No pertinent family history.     Social History     Socioeconomic History    Marital status:      Spouse name: Not on file    Number of children: Not on file    Years of education: Not on file    Highest education level: Not on file   Occupational History    Not on file   Tobacco Use    Smoking status: Not on file    Smokeless tobacco: Not on file   Substance and Sexual Activity    Alcohol use: Not on file    Drug use: Not on file    Sexual activity: Not on file   Other Topics Concern    Not on file   Social History Narrative    Not on file     Social Determinants of Health     Financial Resource Strain:     Difficulty of Paying Living Expenses: Not on file   Food Insecurity:     Worried About Running Out of Food in the Last Year: Not on file    Mick of Food in the Last Year: Not on file   Transportation Needs:     Lack of Transportation (Medical): Not on file    Lack of Transportation (Non-Medical): Not on file   Physical Activity:     Days of Exercise per Week: Not on file    Minutes of Exercise per Session: Not on file   Stress:     Feeling of Stress : Not on file   Social Connections:     Frequency of Communication with Friends and Family: Not on file    Frequency of Social Gatherings with Friends and Family: Not on file    Attends Ep[employer] Services: Not on file    Active Member of [address] [place] [address] [employer] or Organizations: Not on file    Attends Club or Organization Meetings: Not on file    Marital Status: Not on file   Intimate Partner Violence:     Fear of Current or Ex-Partner: Not on file    Emotionally Abused: Not on file    Physically Abused: Not on file    Sexually Abused: Not on file   Housing Stability:     Unable to Pay for Housing in the Last Year: Not on file    Number of [place] in the Last Year: Not on file    Unstable Housing in the Last Year: Not on file         ALLERGIES: Codeine, Morphine, Percocet [oxycodone-acetaminophen], and Tramadol    Review of Systems   Constitutional: Negative for chills and fever. Eyes: Negative for photophobia. Respiratory: Negative for cough and shortness of breath. Cardiovascular: Negative for chest pain. Gastrointestinal: Negative for abdominal pain. Genitourinary: Negative for dysuria. Musculoskeletal: Negative for back pain. Skin: Positive for wound. Neurological: Positive for headaches. Negative for syncope. Psychiatric/Behavioral: Negative for confusion. All other systems reviewed and are negative. Vitals:    12/16/21 2100 12/16/21 2146 12/16/21 2230 12/16/21 2310   BP: (!) 140/67 (!) 149/72 (!) 153/74 (!) 144/71   Pulse: 94 98 92 (!) 111   Resp: 15 20 15 18   Temp:       SpO2: 96% 91% 94% 91%   Weight:                Physical Exam  Constitutional:       General: She is not in acute distress.      Appearance: She is not toxic-appearing. HENT:      Head: Normocephalic. Jaw: There is normal jaw occlusion. Mouth/Throat:      Mouth: Mucous membranes are moist.   Eyes:      Extraocular Movements: Extraocular movements intact. Cardiovascular:      Rate and Rhythm: Normal rate and regular rhythm. Heart sounds: Normal heart sounds. Pulmonary:      Effort: Pulmonary effort is normal. No respiratory distress. Breath sounds: Normal breath sounds. Abdominal:      Palpations: Abdomen is soft. Tenderness: There is no abdominal tenderness. There is no right CVA tenderness or left CVA tenderness. Musculoskeletal:      Cervical back: Normal range of motion. Right lower leg: No edema. Left lower leg: No edema. Skin:     Capillary Refill: Capillary refill takes less than 2 seconds. Neurological:      General: No focal deficit present. Mental Status: She is alert and oriented to person, place, and time. Psychiatric:         Mood and Affect: Mood normal.          MDM  Number of Diagnoses or Management Options    ED Course as of 12/16/21 2326   Thu Dec 16, 2021   2103 Placed on monitor, cardene ordered, neurosurgery consult [NS]      ED Course User Index  [NS] Negrito Lagos MD       Wound Repair    Date/Time: 12/16/2021 11:23 PM  Performed by: attendingPre-procedure re-eval: Immediately prior to the procedure, the patient was reevaluated and found suitable for the planned procedure and any planned medications. Time out: Immediately prior to the procedure a time out was called to verify the correct patient, procedure, equipment, staff and marking as appropriate. .  Location details: face  Wound length:7.6 - 12.5 cm  Anesthesia: local infiltration    Anesthesia:  Local Anesthetic: lidocaine 1% with epinephrine  Anesthetic total: 5 mL  Foreign bodies: no foreign bodies  Irrigation solution: saline  Irrigation method: jet lavage  Skin closure: 6-0 nylon  Number of sutures: 11  Technique: simple  Approximation: close  Dressing: antibiotic ointment  My total time at bedside, performing this procedure was 46-60 minutes. MEDICAL DECISION MAKIN y.o. female presents with Fall    Differential diagnosis includes but not limited to: Skull fracture, laceration, subdural hematoma, concussion, subarachnoid    LABORATORY TESTS:  Labs Reviewed   METABOLIC PANEL, COMPREHENSIVE - Abnormal; Notable for the following components:       Result Value    Potassium 3.1 (*)     Glucose 197 (*)     BUN 58 (*)     Creatinine 2.18 (*)     BUN/Creatinine ratio 27 (*)     GFR est AA 26 (*)     GFR est non-AA 22 (*)     AST (SGOT) 38 (*)     Alk. phosphatase 125 (*)     Globulin 4.2 (*)     A-G Ratio 0.8 (*)     All other components within normal limits   CBC WITH AUTOMATED DIFF - Abnormal; Notable for the following components:    RDW 17.6 (*)     IMMATURE GRANULOCYTES 1 (*)     ABS. MONOCYTES 1.2 (*)     ABS. IMM. GRANS. 0.1 (*)     All other components within normal limits   PROTHROMBIN TIME + INR   SAMPLES BEING HELD       IMAGING RESULTS:  CT HEAD WO CONT   Final Result   Acute left parafalcine subdural hematoma without significant mass effect at this   time.  This result was verbally relayed by me to Dr. Kelsie Hernandez at 2054 hours      789      CT SPINE CERV WO CONT   Final Result      Multifactorial central canal stenosis C2-3 through C6-7   Severe left C3-4, bilateral C4-5, bilateral C5-6, left C6-7 neural foraminal   stenosis   Moderate severe right C3-4 neural foraminal stenosis   No fracture          MEDICATIONS GIVEN:  Medications   niCARdipine (CARDENE) 25 mg in 0.9% sodium chloride 250 mL infusion (5 mg/hr IntraVENous New Bag 21)   lidocaine/EPINEPHrine/tetracaine/methylcellulose (LET) topical gel gel 2 mL (2 mL Topical Given 21)   lidocaine-EPINEPHrine (XYLOCAINE) 2 %-1:100,000 injection 200 mg (200 mg IntraDERMal Given by Provider 21)       PROGRESS NOTE: 10:06 PM patient remains clinically stable    CONSULTS:  intensivist Consult: [address] for Admission  10:07 PM    ED Room Number: SER09/09  Patient Name and age:  Tashi Citizen 78 y.o.  female  Working Diagnosis:   1. Subdural hematoma ([address]    2. Laceration of forehead, initial encounter        COVID-19 Suspicion:  no  Sepsis present:  no  Reassessment needed: no  Code Status:  Full Code  Readmission: no  Isolation Requirements:  no  Recommended Level of Care:  ICU  Department:[facility] Adult ED - 21   Other:   Neurosurgery: 10:07 PM discussed with     Dr. Antonette Meckel, recommends ICU admission, will consult        IMPRESSION:  1. Subdural hematoma ([facility] [address]    2. Laceration of forehead, initial encounter        PLAN:  - Admit to icu    Total critical care time spent exclusive of procedures:  79 minutes          Byron Sheridan MD          Please note that this dictation was completed with [employer], the computer voice recognition software. Quite often unanticipated grammatical, syntax, homophones, and other interpretive errors are inadvertently transcribed by the computer software. Please disregard these errors. Please excuse any errors that have escaped final proofreading.

## 2021-12-17 NOTE — PROGRESS NOTES
Neurocritical Care Code Stroke Documentation      Patient admitted to hospital after a ground level fall and hitting her head. CTH performed showing acute left parafalcine subdural hematoma without significant mass effect at this time. Patient admitted to ICU for close monitoring. CODE STROKE called on patient due to nursing stating that patient had a spell of confusion. RN stated she was seeing things that were not there. The patient denies confusion and states that she does not have her glasses. The right lense was broken. On my assessment, patient not confused and with no neuro deficits. FSBS 232. NIHSS:      1a-LOC:0    1b-Month/Age:0    1c-Open/Close Hand:0    2-Best Gaze:0    3-Visual Fields:0 (does not have her glasses but no identifiable visual loss)    4-Facial Palsy:0    5a-Left Arm:0    5b-Right Arm:0    6a-Left Le    6b-Right Le (does have chronic right lower extremity weakness but was able to hold up for 5 seconds)    7-Limb Ataxia:0    8-Sensory:0 (has chronic bilateral hand/finger numbness due to carpal tunnel)     9-Best Language:0    10-Dysarthria:0    11-Extinction/Inattention:0  TOTAL SCORE:0      Please cancel code stroke. Please obtain repeat CTH if concerned for neuro changes or increase in subdural hematoma, but no neuro changes on my exam. Please call Dr. Floridalma Martinez with [phone] for recommendations. Arrival time: 02:42  Time spent: 20 minutes.      Abhilash Thompson, NP  Neurocritical Care Nurse Practitioner  [phone]

## 2021-12-17 NOTE — PROGRESS NOTES
Admission Medication Reconciliation:    Information obtained from:  [id number], patient, and patient's daughter  Denise End:  NO    Comments/Recommendations: Updated PTA meds/reviewed patient's allergies. 1)  Spoke to patient and daughter in room. Provided a medication list with current medication doses and directions. However, called [facility] to verify since medication list was quite lengthy. 2)  Medication changes (since last review): Added  - Bactrim  - Folic acid    Adjusted  - Torsemide  - Humalog  - Pantoprazole  - Cranberry  - Gabapentin  - Flavoxate    Removed  - Cyanocobalamin    3)  [facility] stated that patient's last gabapentin prescription had the directions \"take 1 tablet in the morning and 4 tablets in the evening\". However, the patient stated that she takes \"1 tablet in the morning and 2 tablets in the evening\". Input the patient's instructions within PTA med list.   ¹[employer] pharmacy benefit data reflects medications filled and processed through the patient's insurance, however   this data does NOT capture whether the medication was picked up or is currently being taken by the patient. Allergies:  Codeine, Morphine, Percocet [oxycodone-acetaminophen], and Tramadol    Significant PMH/Disease States:   Past Medical History:   Diagnosis Date    A-fib ([facility]     Diabetes ([facility]     HTN (hypertension)      Chief Complaint for this Admission:    Chief Complaint   Patient presents with    Fall     Prior to Admission Medications:   Prior to Admission Medications   Prescriptions Last Dose Informant Taking? AZO CRANBERRY 858-10-81 mg-mg-million tab   Yes   Sig: Take 4,000 mg by mouth two (2) times a day. acetaminophen (TylenoL) 325 mg cap   Yes   Sig: Take  by mouth. aspirin delayed-release 81 mg tablet   Yes   Sig: Take 81 mg by mouth daily. atorvastatin (Lipitor) 40 mg tablet   Yes   Sig: Take 40 mg by mouth daily.    docusate sodium (Colace) 100 mg capsule   Yes   Sig: Take 100 mg by mouth two (2) times a day. ferric gluconate (FERRLECIT) infusion   Yes   Sig: by IntraVENous route once. One infusion every 2 weeks   flavoxATE (URISPAS) 100 mg tablet   Yes   Sig: Take 100 mg by mouth three (3) times daily. folic acid (FOLVITE) 1 mg tablet   Yes   Sig: Take 1 mg by mouth daily. gabapentin (NEURONTIN) 300 mg capsule   Yes   Sig: Take 300 mg by mouth Every morning. 1 capsule every morning and 2 capsules at bedtime   gabapentin (NEURONTIN) 600 mg tablet   Yes   Sig: Take 600 mg by mouth every evening. hydroCHLOROthiazide (HYDRODIURIL) 50 mg tablet   Yes   Sig: Take 50 mg by mouth daily. insulin detemir U-100 (LEVEMIR) 100 unit/mL injection   Yes   Si Units by SubCUTAneous route nightly. insulin lispro 200 unit/mL (3 mL) inpn   Yes   Si Units by SubCUTAneous route. 40 units in the morning, 40 units every noon, and 50 units at dinner   levothyroxine (SYNTHROID) 200 mcg tablet   Yes   Sig: Take 200 mcg by mouth Daily (before breakfast). pantoprazole (PROTONIX) 40 mg tablet   Yes   Sig: Take 40 mg by mouth two (2) times a day. Indications: gastroesophageal reflux disease   potassium chloride SR (K-TAB) 20 mEq tablet   Yes   Sig: Take 60 mEq by mouth five (5) times daily. spironolactone (ALDACTONE) 25 mg tablet   Yes   Sig: Take 25 mg by mouth daily. sucralfate (CARAFATE) 1 gram tablet   Yes   Sig: Take 1 g by mouth three (3) times daily. torsemide (DEMADEX) 100 mg tablet   Yes   Sig: Take 100 mg by mouth daily. trimethoprim-sulfamethoxazole (Bactrim DS) 160-800 mg per tablet   Yes   Sig: Take 1 Tablet by mouth daily. Take 1 tablet for 3 days at the start of each month      Facility-Administered Medications: None     Please contact the main inpatient pharmacy with any questions or concerns at [phone] and we will direct you to the clinical pharmacist covering this patient's care while in-house.      TIFFANI Bradley

## 2021-12-18 ENCOUNTER — APPOINTMENT (OUTPATIENT)
Dept: CT IMAGING | Age: 81
DRG: 086 | End: 2021-12-18
Attending: NURSE PRACTITIONER
Payer: MEDICARE

## 2021-12-18 LAB
ALBUMIN SERPL-MCNC: 3 G/DL (ref 3.5–5)
ALBUMIN/GLOB SERPL: 0.8 {RATIO} (ref 1.1–2.2)
ALP SERPL-CCNC: 105 U/L (ref 45–117)
ALT SERPL-CCNC: 31 U/L (ref 12–78)
ANION GAP SERPL CALC-SCNC: 7 MMOL/L (ref 5–15)
APTT PPP: 27.4 SEC (ref 22.1–31)
AST SERPL-CCNC: 29 U/L (ref 15–37)
ATRIAL RATE: 375 BPM
BASOPHILS # BLD: 0.1 K/UL (ref 0–0.1)
BASOPHILS NFR BLD: 1 % (ref 0–1)
BILIRUB SERPL-MCNC: 0.6 MG/DL (ref 0.2–1)
BUN SERPL-MCNC: 56 MG/DL (ref 6–20)
BUN/CREAT SERPL: 32 (ref 12–20)
CALCIUM SERPL-MCNC: 9.2 MG/DL (ref 8.5–10.1)
CALCULATED R AXIS, ECG10: -45 DEGREES
CALCULATED T AXIS, ECG11: 52 DEGREES
CHLORIDE SERPL-SCNC: 99 MMOL/L (ref 97–108)
CO2 SERPL-SCNC: 26 MMOL/L (ref 21–32)
CREAT SERPL-MCNC: 1.77 MG/DL (ref 0.55–1.02)
DIAGNOSIS, 93000: NORMAL
DIFFERENTIAL METHOD BLD: ABNORMAL
EOSINOPHIL # BLD: 0.3 K/UL (ref 0–0.4)
EOSINOPHIL NFR BLD: 4 % (ref 0–7)
ERYTHROCYTE [DISTWIDTH] IN BLOOD BY AUTOMATED COUNT: 17.2 % (ref 11.5–14.5)
GLOBULIN SER CALC-MCNC: 3.7 G/DL (ref 2–4)
GLUCOSE BLD STRIP.AUTO-MCNC: 134 MG/DL (ref 65–117)
GLUCOSE BLD STRIP.AUTO-MCNC: 192 MG/DL (ref 65–117)
GLUCOSE BLD STRIP.AUTO-MCNC: 208 MG/DL (ref 65–117)
GLUCOSE BLD STRIP.AUTO-MCNC: 265 MG/DL (ref 65–117)
GLUCOSE SERPL-MCNC: 209 MG/DL (ref 65–100)
HCT VFR BLD AUTO: 34.9 % (ref 35–47)
HGB BLD-MCNC: 11.5 G/DL (ref 11.5–16)
IMM GRANULOCYTES # BLD AUTO: 0.1 K/UL (ref 0–0.04)
IMM GRANULOCYTES NFR BLD AUTO: 1 % (ref 0–0.5)
INR PPP: 1 (ref 0.9–1.1)
LACTATE SERPL-SCNC: 1.2 MMOL/L (ref 0.4–2)
LYMPHOCYTES # BLD: 1.3 K/UL (ref 0.8–3.5)
LYMPHOCYTES NFR BLD: 16 % (ref 12–49)
MCH RBC QN AUTO: 30.1 PG (ref 26–34)
MCHC RBC AUTO-ENTMCNC: 33 G/DL (ref 30–36.5)
MCV RBC AUTO: 91.4 FL (ref 80–99)
MONOCYTES # BLD: 1 K/UL (ref 0–1)
MONOCYTES NFR BLD: 13 % (ref 5–13)
NEUTS SEG # BLD: 5.3 K/UL (ref 1.8–8)
NEUTS SEG NFR BLD: 65 % (ref 32–75)
NRBC # BLD: 0 K/UL (ref 0–0.01)
NRBC BLD-RTO: 0 PER 100 WBC
PLATELET # BLD AUTO: 172 K/UL (ref 150–400)
PMV BLD AUTO: 11.1 FL (ref 8.9–12.9)
POTASSIUM SERPL-SCNC: 3.3 MMOL/L (ref 3.5–5.1)
PROT SERPL-MCNC: 6.7 G/DL (ref 6.4–8.2)
PROTHROMBIN TIME: 10.4 SEC (ref 9–11.1)
Q-T INTERVAL, ECG07: 342 MS
QRS DURATION, ECG06: 76 MS
QTC CALCULATION (BEZET), ECG08: 462 MS
RBC # BLD AUTO: 3.82 M/UL (ref 3.8–5.2)
SERVICE CMNT-IMP: ABNORMAL
SODIUM SERPL-SCNC: 132 MMOL/L (ref 136–145)
THERAPEUTIC RANGE,PTTT: NORMAL SECS (ref 58–77)
VENTRICULAR RATE, ECG03: 110 BPM
WBC # BLD AUTO: 8.1 K/UL (ref 3.6–11)

## 2021-12-18 PROCEDURE — 74011250637 HC RX REV CODE- 250/637: Performed by: NURSE PRACTITIONER

## 2021-12-18 PROCEDURE — 70450 CT HEAD/BRAIN W/O DYE: CPT

## 2021-12-18 PROCEDURE — 83605 ASSAY OF LACTIC ACID: CPT

## 2021-12-18 PROCEDURE — 85610 PROTHROMBIN TIME: CPT

## 2021-12-18 PROCEDURE — 74011636637 HC RX REV CODE- 636/637: Performed by: INTERNAL MEDICINE

## 2021-12-18 PROCEDURE — 74011250637 HC RX REV CODE- 250/637: Performed by: INTERNAL MEDICINE

## 2021-12-18 PROCEDURE — 80053 COMPREHEN METABOLIC PANEL: CPT

## 2021-12-18 PROCEDURE — 85025 COMPLETE CBC W/AUTO DIFF WBC: CPT

## 2021-12-18 PROCEDURE — 85730 THROMBOPLASTIN TIME PARTIAL: CPT

## 2021-12-18 PROCEDURE — 74011636637 HC RX REV CODE- 636/637: Performed by: NURSE PRACTITIONER

## 2021-12-18 PROCEDURE — 82962 GLUCOSE BLOOD TEST: CPT

## 2021-12-18 PROCEDURE — 65660000001 HC RM ICU INTERMED STEPDOWN

## 2021-12-18 PROCEDURE — 36415 COLL VENOUS BLD VENIPUNCTURE: CPT

## 2021-12-18 RX ORDER — FOLIC ACID 1 MG/1
1 TABLET ORAL DAILY
Status: DISCONTINUED | OUTPATIENT
Start: 2021-12-19 | End: 2021-12-19 | Stop reason: HOSPADM

## 2021-12-18 RX ORDER — INSULIN LISPRO 100 [IU]/ML
30 INJECTION, SOLUTION INTRAVENOUS; SUBCUTANEOUS
Status: CANCELLED | OUTPATIENT
Start: 2021-12-18

## 2021-12-18 RX ORDER — INSULIN GLARGINE 100 [IU]/ML
40 INJECTION, SOLUTION SUBCUTANEOUS DAILY
Status: DISCONTINUED | OUTPATIENT
Start: 2021-12-18 | End: 2021-12-18

## 2021-12-18 RX ORDER — INSULIN GLARGINE 100 [IU]/ML
80 INJECTION, SOLUTION SUBCUTANEOUS
Status: CANCELLED | OUTPATIENT
Start: 2021-12-18

## 2021-12-18 RX ORDER — INSULIN GLARGINE 100 [IU]/ML
60 INJECTION, SOLUTION SUBCUTANEOUS DAILY
Status: DISCONTINUED | OUTPATIENT
Start: 2021-12-19 | End: 2021-12-19 | Stop reason: HOSPADM

## 2021-12-18 RX ORDER — INSULIN LISPRO 100 [IU]/ML
10 INJECTION, SOLUTION INTRAVENOUS; SUBCUTANEOUS
Status: DISCONTINUED | OUTPATIENT
Start: 2021-12-18 | End: 2021-12-18

## 2021-12-18 RX ORDER — INSULIN LISPRO 100 [IU]/ML
20 INJECTION, SOLUTION INTRAVENOUS; SUBCUTANEOUS
Status: DISCONTINUED | OUTPATIENT
Start: 2021-12-18 | End: 2021-12-18

## 2021-12-18 RX ORDER — SPIRONOLACTONE 25 MG/1
25 TABLET ORAL DAILY
Status: DISCONTINUED | OUTPATIENT
Start: 2021-12-19 | End: 2021-12-19 | Stop reason: HOSPADM

## 2021-12-18 RX ORDER — INSULIN LISPRO 100 [IU]/ML
10 INJECTION, SOLUTION INTRAVENOUS; SUBCUTANEOUS
Status: DISCONTINUED | OUTPATIENT
Start: 2021-12-19 | End: 2021-12-19

## 2021-12-18 RX ORDER — POTASSIUM CHLORIDE 750 MG/1
60 TABLET, FILM COATED, EXTENDED RELEASE ORAL 4 TIMES DAILY
Status: DISCONTINUED | OUTPATIENT
Start: 2021-12-18 | End: 2021-12-19 | Stop reason: HOSPADM

## 2021-12-18 RX ORDER — TORSEMIDE 20 MG/1
100 TABLET ORAL
Status: COMPLETED | OUTPATIENT
Start: 2021-12-18 | End: 2021-12-18

## 2021-12-18 RX ADMIN — POTASSIUM CHLORIDE 60 MEQ: 750 TABLET, FILM COATED, EXTENDED RELEASE ORAL at 17:13

## 2021-12-18 RX ADMIN — DOCUSATE SODIUM 100 MG: 100 CAPSULE ORAL at 17:12

## 2021-12-18 RX ADMIN — Medication 2 UNITS: at 08:40

## 2021-12-18 RX ADMIN — LEVOTHYROXINE SODIUM 200 MCG: 0.2 TABLET ORAL at 06:37

## 2021-12-18 RX ADMIN — Medication 10 ML: at 05:07

## 2021-12-18 RX ADMIN — LEVETIRACETAM 250 MG: 250 TABLET, FILM COATED ORAL at 17:11

## 2021-12-18 RX ADMIN — DOCUSATE SODIUM 100 MG: 100 CAPSULE ORAL at 08:54

## 2021-12-18 RX ADMIN — POTASSIUM CHLORIDE 60 MEQ: 750 TABLET, FILM COATED, EXTENDED RELEASE ORAL at 21:17

## 2021-12-18 RX ADMIN — Medication 10 ML: at 14:14

## 2021-12-18 RX ADMIN — Medication 10 UNITS: at 08:42

## 2021-12-18 RX ADMIN — GABAPENTIN 300 MG: 300 CAPSULE ORAL at 08:44

## 2021-12-18 RX ADMIN — PANTOPRAZOLE SODIUM 40 MG: 40 TABLET, DELAYED RELEASE ORAL at 06:37

## 2021-12-18 RX ADMIN — SUCRALFATE 1 G: 1 TABLET ORAL at 08:54

## 2021-12-18 RX ADMIN — INSULIN GLARGINE 40 UNITS: 100 INJECTION, SOLUTION SUBCUTANEOUS at 08:41

## 2021-12-18 RX ADMIN — HYDROCHLOROTHIAZIDE 50 MG: 25 TABLET ORAL at 08:45

## 2021-12-18 RX ADMIN — GABAPENTIN 600 MG: 600 TABLET, FILM COATED ORAL at 21:17

## 2021-12-18 RX ADMIN — Medication 3 UNITS: at 12:38

## 2021-12-18 RX ADMIN — Medication 10 UNITS: at 12:39

## 2021-12-18 RX ADMIN — Medication 20 UNITS: at 17:11

## 2021-12-18 RX ADMIN — ATORVASTATIN CALCIUM 40 MG: 40 TABLET, FILM COATED ORAL at 21:17

## 2021-12-18 RX ADMIN — Medication 10 ML: at 21:20

## 2021-12-18 RX ADMIN — ACETAMINOPHEN 650 MG: 325 TABLET ORAL at 00:06

## 2021-12-18 RX ADMIN — SUCRALFATE 1 G: 1 TABLET ORAL at 21:17

## 2021-12-18 RX ADMIN — SUCRALFATE 1 G: 1 TABLET ORAL at 14:30

## 2021-12-18 RX ADMIN — TORSEMIDE 100 MG: 20 TABLET ORAL at 17:12

## 2021-12-18 RX ADMIN — POTASSIUM CHLORIDE 40 MEQ: 750 TABLET, FILM COATED, EXTENDED RELEASE ORAL at 08:44

## 2021-12-18 RX ADMIN — LEVETIRACETAM 250 MG: 250 TABLET, FILM COATED ORAL at 05:06

## 2021-12-18 NOTE — PROGRESS NOTES
[facility] Adult  Hospitalist Group                                                                                          Hospitalist Progress Note  Jin Alexander MD  Answering service: [phone] from in house phone        Date of Service:  2021  NAME:  Zeynep Mcdonough  :  1940  MRN:  [id number]      Admission Summary: This is a 25-year-old female patient with past medical history significant for atrial fibrillation status post ablation, diabetes, hypertension, COPD not on PPV, CKD, hypothyroidism, coronary artery disease, CABG, multiple orthopedic surgeries to include bilateral shoulder repair, bilateral hip and knee replacements, colon cancer, renal cancer status post left nephrectomy. Patient presented to [facility] last evening after a witnessed mechanical fall. Per patient she was visiting a relative and walked across the kitchen when she tripped on her shoe and fell to the ground. She did not have LOC but complains of pain over the right side of her forehead. She was seen at [facility] where she received 11 sutures. Head CT scan done showed acute left parafalcine subdural hematoma without significant mass-effect. Neurosurgery was alerted and referred patient to be admitted to ICU with monitoring. CT C-spine was negative for acute findings. Patient arrived in ICU alert oriented x4. She is on a nicardipine drip to keep systolic blood pressures less than 140.     She denies chest pain or pressure, headaches, palpitations, nausea or vomiting, fevers or chills, shortness of breath, abdominal pain, diarrhea or constipation. She has not had any recent falls and does not complain of any dizziness. Interval history / Subjective:    Patient doing well and wants to go home. She tells me that she takes KCl 60meq 5X daily and her insulin dose here is too low. She has no other complaints.      Assessment & Plan:     SDH  - Ct stable and OK to DC per NSY  - will DC in AM    Afib s/p alation  - hold ASA; follow up PCP in [facility]    Diabetes, type 2  - increase her Lantus and mealtime insulin to closer to home dose    CKD4  - resume home meds including HCTZ, Demadex and Sprionolactone   - she normally takes KCl 300 meq daily. Will reduce to 240 meq daily  - monitor      -   Code status: DNR  DVT prophylaxis: SCDs    Care Plan discussed with: Patient/Family  Anticipated Disposition: Home w/Family  Anticipated Discharge: Less than 24 hours     Hospital Problems  Never Reviewed          Codes Class Noted POA    Subdural bleeding ([facility] ICD-10-CM: I62.00  ICD-9-CM: 432.1  12/16/2021 Unknown                Review of Systems:   Pertinent items are noted in HPI. Vital Signs:    Last 24hrs VS reviewed since prior progress note. Most recent are:  Visit Vitals  /64   Pulse 81   Temp 97.6 °F (36.4 °C)   Resp 12   Ht 5' 2\" (1.575 m)   Wt 98.5 kg (217 lb 2.5 oz)   SpO2 98%   BMI 39.72 kg/m²         Intake/Output Summary (Last 24 hours) at 12/18/2021 1629  Last data filed at 12/17/2021 2101  Gross per 24 hour   Intake    Output 350 ml   Net -350 ml        Physical Examination:             Constitutional:  No acute distress, cooperative, pleasant    ENT:  Oral mucosa moist, oropharynx benign. Resp:  CTA bilaterally. No wheezing/rhonchi/rales. No accessory muscle use   CV:  Regular rhythm, normal rate, + murmurs, gallops, rubs    GI:  Soft, non distended, non tender. normoactive bowel sounds, no hepatosplenomegaly     Musculoskeletal:  1+ edema, warm, 2+ pulses throughout    Neurologic:  Moves all extremities.   AAOx3,             Data Review:    Review and/or order of clinical lab test      Labs:     Recent Labs     12/18/21  0201 12/16/21 2122   WBC 8.1 10.7   HGB 11.5 13.5   HCT 34.9* 41.6    201     Recent Labs     12/18/21  0201 12/17/21  0134 12/16/21 2122   * 136 138   K 3.3* 3.0* 3.1*   CL 99 101 99   CO2 26 23 26   BUN 56* 57* 58*   CREA 1.77* 2.08* 2.18*   * 206* 197*   CA 9.2 9.4 10.0   MG  --  1.9  --    PHOS  --  3.9  --      Recent Labs     12/18/21 0201 12/16/21 2122   ALT 31 41    125*   TBILI 0.6 0.4   TP 6.7 7.7   ALB 3.0* 3.5   GLOB 3.7 4.2*     Recent Labs     12/18/21 0201 12/16/21 2122   INR 1.0 1.0   PTP 10.4 10.3   APTT 27.4  --       No results for input(s): FE, TIBC, PSAT, FERR in the last 72 hours. No results found for: FOL, RBCF   No results for input(s): PH, PCO2, PO2 in the last 72 hours. No results for input(s): CPK, CKNDX, TROIQ in the last 72 hours.     No lab exists for component: CPKMB  No results found for: CHOL, CHOLX, CHLST, CHOLV, HDL, HDLP, LDL, LDLC, DLDLP, TGLX, TRIGL, TRIGP, CHHD, CHHDX  Lab Results   Component Value Date/Time    Glucose (POC) 265 (H) 12/18/2021 11:55 AM    Glucose (POC) 208 (H) 12/18/2021 07:27 AM    Glucose (POC) 272 (H) 12/17/2021 09:05 PM    Glucose (POC) 177 (H) 12/17/2021 04:19 PM    Glucose (POC) 190 (H) 12/17/2021 11:53 AM     No results found for: COLOR, APPRN, SPGRU, REFSG, JASON, PROTU, GLUCU, KETU, BILU, UROU, ARABELLA, LEUKU, GLUKE, EPSU, BACTU, WBCU, RBCU, CASTS, UCRY      Medications Reviewed:     Current Facility-Administered Medications   Medication Dose Route Frequency    torsemide (DEMADEX) tablet 100 mg  100 mg Oral NOW    [START ON 12/19/2021] spironolactone (ALDACTONE) tablet 25 mg  25 mg Oral DAILY    potassium chloride SR (KLOR-CON 10) tablet 60 mEq  60 mEq Oral QID    [START ON 61/52/9919] folic acid (FOLVITE) tablet 1 mg  1 mg Oral DAILY    [START ON 12/19/2021] insulin glargine (LANTUS) injection 60 Units  60 Units SubCUTAneous DAILY    insulin lispro (HUMALOG) injection 20 Units  20 Units SubCUTAneous TIDAC    sodium chloride (NS) flush 5-40 mL  5-40 mL IntraVENous Q8H    sodium chloride (NS) flush 5-40 mL  5-40 mL IntraVENous PRN    acetaminophen (TYLENOL) tablet 650 mg  650 mg Oral Q6H PRN    Or    acetaminophen (TYLENOL) suppository 650 mg  650 mg Rectal Q6H PRN    polyethylene glycol (MIRALAX) packet 17 g  17 g Oral DAILY PRN    ondansetron (ZOFRAN ODT) tablet 4 mg  4 mg Oral Q8H PRN    Or    ondansetron (ZOFRAN) injection 4 mg  4 mg IntraVENous Q6H PRN    glucose chewable tablet 16 g  4 Tablet Oral PRN    dextrose (D50W) injection syrg 12.5-25 g  12.5-25 g IntraVENous PRN    glucagon (GLUCAGEN) injection 1 mg  1 mg IntraMUSCular PRN    levothyroxine (SYNTHROID) tablet 200 mcg  200 mcg Oral ACB    sucralfate (CARAFATE) tablet 1 g  1 g Oral TID    docusate sodium (COLACE) capsule 100 mg  100 mg Oral BID    cyanocobalamin (VITAMIN B12) tablet 1,000 mcg  1,000 mcg Oral DAILY    atorvastatin (LIPITOR) tablet 40 mg  40 mg Oral DAILY    gabapentin (NEURONTIN) tablet 600 mg  600 mg Oral QPM    gabapentin (NEURONTIN) capsule 300 mg  300 mg Oral QAM    levETIRAcetam (KEPPRA) tablet 250 mg  250 mg Oral Q12H    dextrose (D50W) injection syrg 12.5-25 g  25-50 mL IntraVENous PRN    hydroCHLOROthiazide (HYDRODIURIL) tablet 50 mg  50 mg Oral DAILY    niCARdipine (CARDENE) 25 mg in 0.9% sodium chloride 250 mL infusion  0-15 mg/hr IntraVENous TITRATE     ______________________________________________________________________  EXPECTED LENGTH OF STAY: - - -  ACTUAL LENGTH OF STAY:          2                 Lauren Brito MD

## 2021-12-18 NOTE — PROGRESS NOTES
Bedside shift change report given to Margaret Mejia LPN (oncoming nurse) by Herbert Rae RN (offgoing nurse). Report included the following information SBAR, Kardex, MAR and Cardiac Rhythm NSR.

## 2021-12-18 NOTE — PROGRESS NOTES
Bedside shift change report given to Genesis/RN (oncoming nurse) by Lina/LPN (offgoing nurse). Report included the following information SBAR, Kardex, Procedure Summary, Intake/Output, MAR, Med Rec Status, Alarm Parameters , Quality Measures and Dual Neuro Assessment.

## 2021-12-19 VITALS
TEMPERATURE: 98.2 F | HEIGHT: 62 IN | BODY MASS INDEX: 40.29 KG/M2 | DIASTOLIC BLOOD PRESSURE: 76 MMHG | RESPIRATION RATE: 14 BRPM | SYSTOLIC BLOOD PRESSURE: 138 MMHG | WEIGHT: 218.92 LBS | OXYGEN SATURATION: 96 % | HEART RATE: 92 BPM

## 2021-12-19 LAB
ALBUMIN SERPL-MCNC: 3.1 G/DL (ref 3.5–5)
ANION GAP SERPL CALC-SCNC: 8 MMOL/L (ref 5–15)
BUN SERPL-MCNC: 52 MG/DL (ref 6–20)
BUN/CREAT SERPL: 29 (ref 12–20)
CALCIUM SERPL-MCNC: 9.1 MG/DL (ref 8.5–10.1)
CHLORIDE SERPL-SCNC: 101 MMOL/L (ref 97–108)
CO2 SERPL-SCNC: 26 MMOL/L (ref 21–32)
COMMENT, HOLDF: NORMAL
CREAT SERPL-MCNC: 1.77 MG/DL (ref 0.55–1.02)
GLUCOSE BLD STRIP.AUTO-MCNC: 208 MG/DL (ref 65–117)
GLUCOSE SERPL-MCNC: 208 MG/DL (ref 65–100)
PHOSPHATE SERPL-MCNC: 3.4 MG/DL (ref 2.6–4.7)
POTASSIUM SERPL-SCNC: 3.7 MMOL/L (ref 3.5–5.1)
SAMPLES BEING HELD,HOLD: NORMAL
SERVICE CMNT-IMP: ABNORMAL
SODIUM SERPL-SCNC: 135 MMOL/L (ref 136–145)

## 2021-12-19 PROCEDURE — 82962 GLUCOSE BLOOD TEST: CPT

## 2021-12-19 PROCEDURE — 74011250637 HC RX REV CODE- 250/637: Performed by: NURSE PRACTITIONER

## 2021-12-19 PROCEDURE — 74011250637 HC RX REV CODE- 250/637: Performed by: INTERNAL MEDICINE

## 2021-12-19 PROCEDURE — 74011636637 HC RX REV CODE- 636/637: Performed by: INTERNAL MEDICINE

## 2021-12-19 PROCEDURE — 36415 COLL VENOUS BLD VENIPUNCTURE: CPT

## 2021-12-19 PROCEDURE — 80069 RENAL FUNCTION PANEL: CPT

## 2021-12-19 RX ORDER — LEVETIRACETAM 250 MG/1
250 TABLET ORAL EVERY 12 HOURS
Qty: 60 TABLET | Refills: 0 | Status: SHIPPED | OUTPATIENT
Start: 2021-12-19

## 2021-12-19 RX ORDER — INSULIN LISPRO 100 [IU]/ML
20 INJECTION, SOLUTION INTRAVENOUS; SUBCUTANEOUS
Status: DISCONTINUED | OUTPATIENT
Start: 2021-12-19 | End: 2021-12-19 | Stop reason: HOSPADM

## 2021-12-19 RX ADMIN — SUCRALFATE 1 G: 1 TABLET ORAL at 08:00

## 2021-12-19 RX ADMIN — Medication 10 ML: at 05:27

## 2021-12-19 RX ADMIN — GABAPENTIN 300 MG: 300 CAPSULE ORAL at 09:57

## 2021-12-19 RX ADMIN — LEVETIRACETAM 250 MG: 250 TABLET, FILM COATED ORAL at 05:27

## 2021-12-19 RX ADMIN — FOLIC ACID 1 MG: 1 TABLET ORAL at 09:57

## 2021-12-19 RX ADMIN — Medication 20 UNITS: at 07:32

## 2021-12-19 RX ADMIN — DOCUSATE SODIUM 100 MG: 100 CAPSULE ORAL at 09:58

## 2021-12-19 RX ADMIN — INSULIN GLARGINE 60 UNITS: 100 INJECTION, SOLUTION SUBCUTANEOUS at 10:02

## 2021-12-19 RX ADMIN — HYDROCHLOROTHIAZIDE 50 MG: 25 TABLET ORAL at 09:58

## 2021-12-19 RX ADMIN — SPIRONOLACTONE 25 MG: 25 TABLET ORAL at 09:57

## 2021-12-19 RX ADMIN — POTASSIUM CHLORIDE 60 MEQ: 750 TABLET, FILM COATED, EXTENDED RELEASE ORAL at 09:58

## 2021-12-19 RX ADMIN — LEVOTHYROXINE SODIUM 200 MCG: 0.2 TABLET ORAL at 07:20

## 2021-12-19 NOTE — PROGRESS NOTES
Bedside shift change report given to Manisha/RN (oncoming nurse) by Lina/LPN (offgoing nurse). Report included the following information SBAR, Kardex, Intake/Output, MAR, Recent Results, Alarm Parameters , Quality Measures and Dual Neuro Assessment.

## 2021-12-19 NOTE — PROGRESS NOTES
Problem: Falls - Risk of  Goal: *Absence of Falls  Description: Document Alin Alvarez Fall Risk and appropriate interventions in the flowsheet. Outcome: Progressing Towards Goal  Note: Fall Risk Interventions:  Mobility Interventions: Patient to call before getting OOB,Communicate number of staff needed for ambulation/transfer         Medication Interventions: Patient to call before getting OOB,Teach patient to arise slowly    Elimination Interventions: Call light in reach,Patient to call for help with toileting needs,Toileting schedule/hourly rounds    History of Falls Interventions: Consult care management for discharge planning,Vital signs minimum Q4HRs X 24 hrs (comment for end date),Door open when patient unattended    Problem: Patient Education: Go to Patient Education Activity  Goal: Patient/Family Education  Outcome: Progressing Towards Goal     Problem: Pressure Injury - Risk of  Goal: *Prevention of pressure injury  Description: Document Isai Scale and appropriate interventions in the flowsheet.   Outcome: Progressing Towards Goal  Note: Pressure Injury Interventions:       Moisture Interventions: Apply protective barrier, creams and emollients,Internal/External urinary devices    Activity Interventions: Increase time out of bed,Pressure redistribution bed/mattress(bed type)    Mobility Interventions: Chair cushion,HOB 30 degrees or less    Nutrition Interventions: Document food/fluid/supplement intake

## 2021-12-19 NOTE — ROUTINE PROCESS
Bedside shift change report given to Lina MILLER (oncoming nurse) by Jack Preston RN (offgoing nurse). Report included the following information SBAR, Kardex, MAR, Recent Results, Alarm Parameters  and Quality Measures.

## 2021-12-19 NOTE — DISCHARGE INSTRUCTIONS
Discharge Instructions       PATIENT ID: Param Harrison  MRN: [id number]   YOB: 1940    DATE OF ADMISSION: 12/16/2021  7:46 PM    DATE OF DISCHARGE: 12/19/2021    PRIMARY CARE PROVIDER: Paulette Amezcua, Not On File, FNP-C     ATTENDING PHYSICIAN: Bernardo Vaca MD  DISCHARGING PROVIDER: Karen Rogers MD    To contact this individual call [phone] and ask the  to page. If unavailable ask to be transferred the Adult Hospitalist Department. DISCHARGE DIAGNOSES Subdural Hematoma    CONSULTATIONS: IP CONSULT TO INTENSIVIST  IP CONSULT TO NEUROSURGERY    PROCEDURES/SURGERIES: * No surgery found *    PENDING TEST RESULTS:   At the time of discharge the following test results are still pending: none    FOLLOW UP APPOINTMENTS:   Follow-up Information     Follow up With Specialties Details Why Contact Info    [facility] F[facility]   Not On File (58) Patient has a PCP but that physician is not listed in [address] [place] [address]. [facility], Not On File, FNP-C Internal Medicine In 1 week Hospital follow up Not On File (58) Patient has a PCP but that physician is not listed in [facility]. ADDITIONAL CARE RECOMMENDATIONS:   1. Follow up with your PCP in [facility] in next 1-2 weeks. You should also follow up with a neurosurgeon in [facility]. You may follow up with Dr. Srikanth Rodriguez if you are still in [place] in 2 weeks. 2.  If your PCP wants you to continue Keppra for seizure prevention, they will refill that medication. You should not have to stay on Keppra for too long as it is just added while your subdural hematoma is healing. 3.  No aspirin until approved by your [facility] doctors  4. Continue taking your gabapentin as you always have. 5.  If you experience any increase of headache, visual changes, weakness, difficulty speaking, confusion, pass out or visual changes seek medical attention immediately.         DIET: Diabetic Diet     ACTIVITY: Activity as tolerated    WOUND CARE: none    EQUIPMENT needed: none      DISCHARGE MEDICATIONS:   See Medication Reconciliation Form    · It is important that you take the medication exactly as they are prescribed. · Keep your medication in the bottles provided by the pharmacist and keep a list of the medication names, dosages, and times to be taken in your wallet. · Do not take other medications without consulting your doctor. NOTIFY YOUR PHYSICIAN FOR ANY OF THE FOLLOWING:   Fever over 101 degrees for 24 hours. Chest pain, shortness of breath, fever, chills, nausea, vomiting, diarrhea, change in mentation, falling, weakness, bleeding. Severe pain or pain not relieved by medications. Or, any other signs or symptoms that you may have questions about.       DISPOSITION:  x  Home With:   OT  PT  [facility]  RN       SNF/Inpatient Rehab/LTAC    Independent/assisted living    Hospice    Other:     CDMP Checked:   Yes ***     PROBLEM LIST Updated:  Yes ***       Signed:   Shea Martinez MD  12/19/2021  8:22 AM

## 2021-12-19 NOTE — DISCHARGE SUMMARY
Discharge Summary       PATIENT ID: Patel Machuca  MRN: [id number]   YOB: 1940    DATE OF ADMISSION: 12/16/2021  7:46 PM    DATE OF DISCHARGE: 12/189/2021      PRIMARY CARE PROVIDER: Teetee Carlson, Not On File, FNP-C     ATTENDING PHYSICIAN: Joanne Brennan MD  DISCHARGING PROVIDER: Joanne Brennan MD    To contact this individual call [phone] and ask the  to page. If unavailable ask to be transferred the Adult Hospitalist Department.     CONSULTATIONS: IP CONSULT TO INTENSIVIST  IP CONSULT TO NEUROSURGERY    PROCEDURES/SURGERIES: * No surgery found *    ADMITTING [facility] COURSE:   This is a 78-year-old female patient with past medical history significant for atrial fibrillation status post ablation, diabetes, hypertension, COPD not on PPV, CKD, hypothyroidism, coronary artery disease, CABG, multiple orthopedic surgeries to include bilateral shoulder repair, bilateral hip and knee replacements, colon cancer, renal cancer status post left nephrectomy.  Patient presented to short [facility] ED last evening after a witnessed mechanical fall.  Per patient she was visiting a relative and walked across the kitchen when she tripped on her shoe and fell to the ground.  She did not have LOC but complains of pain over the right side of her forehead.  She was seen at [facility] where she received 11 sutures.  Head CT scan done showed acute left parafalcine subdural hematoma without significant mass-effect.  Neurosurgery was alerted and referred patient to be admitted to ICU with monitoring.  CT C-spine was negative for acute findings.  Patient arrived in ICU alert oriented x4.  She is on a nicardipine drip to keep systolic blood pressures less than 140.     She denies chest pain or pressure, headaches, palpitations, nausea or vomiting, fevers or chills, shortness of breath, abdominal pain, diarrhea or constipation. Sesar Bejarano has not had any recent falls and does not complain of any dizziness. Patient was admitted and placed in ICU. Keppra was started for seizure prophylaxis. Aspirin was stopped. Blood pressure was controlled. Neurosurgery was consulted. No intervention was felt needed. Repeat CT yesterday was stable. Neurosurgery felt she was safe for discharge. It was recommended she follow up with neurosurgery in 2 weeks but she is returning to [place]. She is to remain off of aspirin. Today she is doing well and has no complaints other than a slight headache. She has been ambulating in room. No CP or SOB. DISCHARGE DIAGNOSES / PLAN:      SDH  - Ct stable and OK to DC per [facility]  - no ASA  - Keppra PO script for 30 days  - will need f/u in [facility] with PCP and neurosurgery     Afib s/p alation  - hold ASA; follow up PCP in [facility]     Diabetes, type 2  - dc on home soes     CKD 4  - resume home meds including HCTZ, Demadex and Sprionolactone        ADDITIONAL CARE RECOMMENDATIONS:   1. Follow up with your PCP in [facility] in next 1-2 weeks. You should also follow up with a neurosurgeon in [facility]. You may follow up with Dr. Rosalind Mondragon if you are still in [facility] in 2 weeks. Dr. Brice Melquiades contact information has been provided to you. 2.  If your PCP wants you to continue Keppra for seizure prevention, they will refill that medication. You should not have to stay on Keppra for too long as it is just added while your subdural hematoma is healing. 3.  No aspirin until approved by your [facility] doctors  4. Continue taking your gabapentin as you always have. 5.  If you experience any increase of headache, visual changes, weakness, difficulty speaking, confusion, pass out or visual changes seek medical attention immediately. DIET: Diabetic Diet     ACTIVITY: Activity as tolerated    WOUND CARE: none    EQUIPMENT needed: none      DISCHARGE MEDICATIONS:   See Medication Reconciliation Form    · It is important that you take the medication exactly as they are prescribed.    · Keep your medication in the bottles provided by the pharmacist and keep a list of the medication names, dosages, and times to be taken in your wallet. · Do not take other medications without consulting your doctor. NOTIFY YOUR PHYSICIAN FOR ANY OF THE FOLLOWING:   Fever over 101 degrees for 24 hours. Chest pain, shortness of breath, fever, chills, nausea, vomiting, diarrhea, change in mentation, falling, weakness, bleeding. Severe pain or pain not relieved by medications. Or, any other signs or symptoms that you may have questions about. PENDING TEST RESULTS:   At the time of discharge the following test results are still pending: none    FOLLOW UP APPOINTMENTS:    Follow-up Information     Follow up With Specialties Details Why Contact Info    [facility] F[facility]C Internal Medicine   Not On File (58) Patient has a PCP but that physician is not listed in [address] [place] [address]. [facility], Not On File, FNP-C Internal Medicine In 1 week Hospital follow up Not On File (58) Patient has a PCP but that physician is not listed in [address] [place] [address]. DISCHARGE MEDICATIONS:  Current Discharge Medication List      START taking these medications    Details   levETIRAcetam (KEPPRA) 250 mg tablet Take 1 Tablet by mouth every twelve (12) hours every twelve (12) hours. Qty: 60 Tablet, Refills: 0  Start date: 12/19/2021         CONTINUE these medications which have NOT CHANGED    Details   folic acid (FOLVITE) 1 mg tablet Take 1 mg by mouth daily. trimethoprim-sulfamethoxazole (Bactrim DS) 160-800 mg per tablet Take 1 Tablet by mouth daily. Take 1 tablet for 3 days at the start of each month      acetaminophen (TylenoL) 325 mg cap Take  by mouth. docusate sodium (Colace) 100 mg capsule Take 100 mg by mouth two (2) times a day. AZO CRANBERRY 841-44-02 mg-mg-million tab Take 4,000 mg by mouth two (2) times a day. flavoxATE (URISPAS) 100 mg tablet Take 100 mg by mouth three (3) times daily. gabapentin (NEURONTIN) 300 mg capsule Take 300 mg by mouth Every morning. 1 capsule every morning and 2 capsules at bedtime      insulin lispro 200 unit/mL (3 mL) inpn 40 Units by SubCUTAneous route. 40 units in the morning, 40 units every noon, and 50 units at dinner      hydroCHLOROthiazide (HYDRODIURIL) 50 mg tablet Take 50 mg by mouth daily. levothyroxine (SYNTHROID) 200 mcg tablet Take 200 mcg by mouth Daily (before breakfast). insulin detemir U-100 (LEVEMIR) 100 unit/mL injection 80 Units by SubCUTAneous route nightly. atorvastatin (Lipitor) 40 mg tablet Take 40 mg by mouth daily. pantoprazole (PROTONIX) 40 mg tablet Take 40 mg by mouth two (2) times a day. Indications: gastroesophageal reflux disease      potassium chloride SR (K-TAB) 20 mEq tablet Take 60 mEq by mouth five (5) times daily. spironolactone (ALDACTONE) 25 mg tablet Take 25 mg by mouth daily. sucralfate (CARAFATE) 1 gram tablet Take 1 g by mouth three (3) times daily. torsemide (DEMADEX) 100 mg tablet Take 100 mg by mouth daily. ferric gluconate (FERRLECIT) infusion by IntraVENous route once. One infusion every 2 weeks         STOP taking these medications       gabapentin (NEURONTIN) 600 mg tablet Comments:   Reason for Stopping:         aspirin delayed-release 81 mg tablet Comments:   Reason for Stopping:                 NOTIFY YOUR PHYSICIAN FOR ANY OF THE FOLLOWING:   Fever over 101 degrees for 24 hours. Chest pain, shortness of breath, fever, chills, nausea, vomiting, diarrhea, change in mentation, falling, weakness, bleeding. Severe pain or pain not relieved by medications. Or, any other signs or symptoms that you may have questions about.     DISPOSITION:  x  Home With:   OT  PT  HH  RN       Long term SNF/Inpatient Rehab    Independent/assisted living    Hospice    Other:       PATIENT CONDITION AT DISCHARGE:     Functional status    Poor     Deconditioned    x Independent      Cognition   x Lucid     Forgetful     Dementia      Catheters/lines (plus indication)    Cruz     PICC     PEG    x None      Code status   x  Full code     DNR      PHYSICAL EXAMINATION AT DISCHARGE:  General:          Alert, cooperative, no distress, appears stated age. HEENT:           periorbital ecchymoses, sclerae, pink conjunctivae                              Lungs:             Clear to auscultation bilaterally. No Wheezing or Rhonchi. No rales. Chest wall:      No tenderness  No Accessory muscle use. Heart:              Regular  rhythm,  No  murmur   No edema  Abdomen:        Soft, non-tender. Not distended. Bowel sounds normal  Extremities:     No cyanosis. No clubbing,                          Psych:             Not anxious or agitated.   Neurologic:      Alert, moves all extremities, answers questions appropriately and responds to commands       CHRONIC MEDICAL DIAGNOSES:  Problem List as of 12/19/2021 Never Reviewed          Codes Class Noted - Resolved    [facility] ([facility] ICD-10-CM: I62.00  ICD-9-CM: 432.1  12/16/2021 - Present              Greater than 45 minutes were spent with the patient on counseling and coordination of care    Signed:   Steph Stevenson MD  12/19/2021  8:28 AM

## 2022-05-10 ENCOUNTER — OFFICE VISIT (OUTPATIENT)
Dept: NEUROLOGY | Age: 82
End: 2022-05-10
Payer: MEDICARE

## 2022-05-10 VITALS — WEIGHT: 198 LBS | HEART RATE: 72 BPM | DIASTOLIC BLOOD PRESSURE: 74 MMHG | SYSTOLIC BLOOD PRESSURE: 126 MMHG

## 2022-05-10 DIAGNOSIS — F01.50 VASCULAR DEMENTIA WITHOUT BEHAVIORAL DISTURBANCE (HCC): Primary | ICD-10-CM

## 2022-05-10 DIAGNOSIS — R27.0 ATAXIA: ICD-10-CM

## 2022-05-10 DIAGNOSIS — G31.84 MCI (MILD COGNITIVE IMPAIRMENT): ICD-10-CM

## 2022-05-10 DIAGNOSIS — R53.1 GENERAL WEAKNESS: ICD-10-CM

## 2022-05-10 DIAGNOSIS — S09.90XA CLOSED HEAD INJURY, INITIAL ENCOUNTER: ICD-10-CM

## 2022-05-10 PROBLEM — H40.013 OAG (OPEN ANGLE GLAUCOMA) SUSPECT, LOW RISK, BILATERAL: Status: ACTIVE | Noted: 2018-01-04

## 2022-05-10 PROBLEM — I48.0 PAROXYSMAL ATRIAL FIBRILLATION (HCC): Status: ACTIVE | Noted: 2018-10-19

## 2022-05-10 PROBLEM — D64.9 ANEMIA: Status: ACTIVE | Noted: 2022-05-10

## 2022-05-10 PROBLEM — I10 ESSENTIAL HYPERTENSION: Status: ACTIVE | Noted: 2019-02-11

## 2022-05-10 PROBLEM — I50.9 CHF (CONGESTIVE HEART FAILURE) (HCC): Status: ACTIVE | Noted: 2019-08-02

## 2022-05-10 PROBLEM — G56.00 CARPAL TUNNEL SYNDROME: Status: ACTIVE | Noted: 2022-05-10

## 2022-05-10 PROBLEM — J44.9 COPD (CHRONIC OBSTRUCTIVE PULMONARY DISEASE) (HCC): Status: ACTIVE | Noted: 2019-08-02

## 2022-05-10 PROBLEM — C18.4 MALIGNANT TUMOR OF TRANSVERSE COLON (HCC): Status: ACTIVE | Noted: 2018-06-22

## 2022-05-10 PROBLEM — E87.20 ACIDOSIS: Status: ACTIVE | Noted: 2018-07-25

## 2022-05-10 PROBLEM — H52.223 REGULAR ASTIGMATISM OF BOTH EYES: Status: ACTIVE | Noted: 2018-01-24

## 2022-05-10 PROBLEM — M25.839 MASS OF JOINT OF WRIST: Status: ACTIVE | Noted: 2022-05-10

## 2022-05-10 PROBLEM — M79.605 PAIN OF LEFT LOWER EXTREMITY: Status: ACTIVE | Noted: 2018-04-23

## 2022-05-10 PROBLEM — I25.10 CORONARY ATHEROSCLEROSIS: Status: ACTIVE | Noted: 2019-02-25

## 2022-05-10 PROBLEM — I50.22 CHRONIC SYSTOLIC HEART FAILURE (HCC): Status: ACTIVE | Noted: 2019-02-25

## 2022-05-10 PROBLEM — E78.5 DYSLIPIDEMIA: Status: ACTIVE | Noted: 2019-02-12

## 2022-05-10 PROBLEM — K64.4 RESIDUAL HEMORRHOIDAL SKIN TAGS: Status: ACTIVE | Noted: 2018-04-23

## 2022-05-10 PROBLEM — L89.92 PRESSURE INJURY, STAGE 2 (HCC): Status: ACTIVE | Noted: 2022-05-10

## 2022-05-10 PROBLEM — E78.2 MIXED HYPERLIPIDEMIA: Status: ACTIVE | Noted: 2022-05-10

## 2022-05-10 PROBLEM — I35.0 AORTIC STENOSIS: Status: ACTIVE | Noted: 2019-08-02

## 2022-05-10 PROBLEM — Z99.81 DEPENDENCE ON SUPPLEMENTAL OXYGEN: Status: ACTIVE | Noted: 2018-04-23

## 2022-05-10 PROBLEM — G47.33 OSA TREATED WITH BIPAP: Status: ACTIVE | Noted: 2022-05-10

## 2022-05-10 PROBLEM — Z97.8 FOLEY CATHETER IN PLACE: Status: ACTIVE | Noted: 2019-08-02

## 2022-05-10 PROBLEM — E03.8 OTHER SPECIFIED HYPOTHYROIDISM: Status: ACTIVE | Noted: 2022-05-10

## 2022-05-10 PROBLEM — R82.90 ABNORMAL URINALYSIS: Status: ACTIVE | Noted: 2018-04-23

## 2022-05-10 PROBLEM — K31.84 GASTROPARESIS: Status: ACTIVE | Noted: 2018-04-23

## 2022-05-10 PROBLEM — H18.529 COGAN'S CORNEAL DYSTROPHY: Status: ACTIVE | Noted: 2018-01-24

## 2022-05-10 PROBLEM — K21.9 ESOPHAGEAL REFLUX: Status: ACTIVE | Noted: 2022-05-10

## 2022-05-10 PROBLEM — K62.1 RECTAL POLYP: Status: ACTIVE | Noted: 2018-06-01

## 2022-05-10 PROBLEM — E66.3 OVERWEIGHT: Status: ACTIVE | Noted: 2022-05-10

## 2022-05-10 PROBLEM — M25.519 SHOULDER PAIN: Status: ACTIVE | Noted: 2022-05-10

## 2022-05-10 PROBLEM — M12.811 ROTATOR CUFF ARTHROPATHY OF RIGHT SHOULDER: Status: ACTIVE | Noted: 2018-04-23

## 2022-05-10 PROBLEM — E66.01 MORBID OBESITY (HCC): Status: ACTIVE | Noted: 2022-05-10

## 2022-05-10 PROCEDURE — 99204 OFFICE O/P NEW MOD 45 MIN: CPT | Performed by: PSYCHIATRY & NEUROLOGY

## 2022-05-10 RX ORDER — FLAVOXATE HYDROCHLORIDE 100 MG/1
TABLET ORAL
COMMUNITY
Start: 2020-06-30

## 2022-05-10 RX ORDER — LEVOFLOXACIN 250 MG/1
TABLET ORAL
COMMUNITY
Start: 2021-08-08

## 2022-05-10 RX ORDER — AMIODARONE HYDROCHLORIDE 200 MG/1
TABLET ORAL
COMMUNITY
Start: 2018-03-26

## 2022-05-10 RX ORDER — UREA 10 %
LOTION (ML) TOPICAL
COMMUNITY
Start: 2020-08-08

## 2022-05-10 RX ORDER — BLOOD-GLUCOSE METER
EACH MISCELLANEOUS
COMMUNITY
Start: 2022-03-07

## 2022-05-10 RX ORDER — IPRATROPIUM BROMIDE AND ALBUTEROL SULFATE 2.5; .5 MG/3ML; MG/3ML
3 SOLUTION RESPIRATORY (INHALATION)
COMMUNITY
Start: 2020-10-15

## 2022-05-10 RX ORDER — ATORVASTATIN CALCIUM 40 MG/1
40 TABLET, FILM COATED ORAL DAILY
COMMUNITY

## 2022-05-10 RX ORDER — CIPROFLOXACIN 500 MG/1
TABLET, FILM COATED ORAL
COMMUNITY
Start: 2021-03-04

## 2022-05-10 RX ORDER — BLOOD SUGAR DIAGNOSTIC
STRIP MISCELLANEOUS
COMMUNITY
Start: 2022-03-30

## 2022-05-10 RX ORDER — PANTOPRAZOLE SODIUM 20 MG/1
20 TABLET, DELAYED RELEASE ORAL 2 TIMES DAILY
COMMUNITY

## 2022-05-10 RX ORDER — GABAPENTIN 300 MG/1
300 CAPSULE ORAL EVERY MORNING
COMMUNITY
Start: 2020-08-01

## 2022-05-10 RX ORDER — BENZONATATE 100 MG/1
CAPSULE ORAL
COMMUNITY

## 2022-05-10 RX ORDER — FOLIC ACID 1 MG/1
1 TABLET ORAL DAILY
COMMUNITY
Start: 2020-08-15

## 2022-05-10 RX ORDER — CLOBETASOL PROPIONATE 0.46 MG/ML
SOLUTION TOPICAL
COMMUNITY
Start: 2021-10-22

## 2022-05-10 RX ORDER — CLOTRIMAZOLE AND BETAMETHASONE DIPROPIONATE 10; .64 MG/G; MG/G
CREAM TOPICAL
COMMUNITY
Start: 2021-07-16

## 2022-05-10 RX ORDER — LINEZOLID 600 MG/1
TABLET, FILM COATED ORAL
COMMUNITY
Start: 2020-12-15

## 2022-05-10 RX ORDER — BACLOFEN 10 MG/1
TABLET ORAL
COMMUNITY
Start: 2021-07-16

## 2022-05-10 RX ORDER — PSEUDOEPHEDRINE HCL 30 MG
TABLET ORAL
COMMUNITY
Start: 2020-05-09

## 2022-05-10 RX ORDER — APIXABAN 5 MG/1
TABLET, FILM COATED ORAL
COMMUNITY
Start: 2022-03-10

## 2022-05-10 RX ORDER — ALLOPURINOL 100 MG/1
TABLET ORAL
COMMUNITY
Start: 2022-03-10

## 2022-05-10 RX ORDER — LEVETIRACETAM 250 MG/1
250 TABLET ORAL EVERY 12 HOURS
COMMUNITY
Start: 2021-12-19

## 2022-05-10 RX ORDER — NYSTATIN 100000 [USP'U]/G
POWDER TOPICAL
COMMUNITY
Start: 2018-03-26

## 2022-05-10 RX ORDER — MEGESTROL ACETATE 40 MG/1
TABLET ORAL
COMMUNITY
Start: 2022-02-25

## 2022-05-10 RX ORDER — GENTAMICIN SULFATE 1 MG/G
CREAM TOPICAL 2 TIMES DAILY
COMMUNITY
Start: 2020-07-16

## 2022-05-10 RX ORDER — SPIRONOLACTONE 25 MG/1
TABLET ORAL
COMMUNITY
Start: 2022-03-24

## 2022-05-10 RX ORDER — TOLTERODINE 4 MG/1
CAPSULE, EXTENDED RELEASE ORAL
COMMUNITY

## 2022-05-10 RX ORDER — ASCORBIC ACID 1000 MG
TABLET, EXTENDED RELEASE ORAL
COMMUNITY

## 2022-05-10 RX ORDER — SUCRALFATE 1 G/1
TABLET ORAL
COMMUNITY
Start: 2022-04-27

## 2022-05-10 RX ORDER — TORSEMIDE 100 MG/1
TABLET ORAL
COMMUNITY
Start: 2022-03-24

## 2022-05-10 RX ORDER — KETOCONAZOLE 20 MG/G
CREAM TOPICAL
COMMUNITY
Start: 2022-03-07

## 2022-05-10 RX ORDER — POTASSIUM CHLORIDE 1500 MG/1
TABLET, EXTENDED RELEASE ORAL
COMMUNITY
Start: 2022-04-21

## 2022-05-10 RX ORDER — INSULIN GLARGINE 100 [IU]/ML
INJECTION, SOLUTION SUBCUTANEOUS
COMMUNITY
Start: 2018-03-26

## 2022-05-10 RX ORDER — TRIAMCINOLONE ACETONIDE 1 MG/G
CREAM TOPICAL
COMMUNITY
Start: 2022-03-07

## 2022-05-10 RX ORDER — NITROFURANTOIN 25; 75 MG/1; MG/1
CAPSULE ORAL
COMMUNITY
Start: 2022-03-17

## 2022-05-10 RX ORDER — FLUCONAZOLE 200 MG/1
200 TABLET ORAL DAILY
COMMUNITY
Start: 2018-07-09

## 2022-05-10 RX ORDER — ASPIRIN 81 MG/1
81 TABLET ORAL DAILY
COMMUNITY

## 2022-05-10 RX ORDER — COLCHICINE 0.6 MG/1
TABLET ORAL
COMMUNITY
Start: 2021-11-29

## 2022-05-10 RX ORDER — DILTIAZEM HYDROCHLORIDE 120 MG/1
CAPSULE, EXTENDED RELEASE ORAL
COMMUNITY

## 2022-05-10 RX ORDER — NYSTATIN AND TRIAMCINOLONE ACETONIDE 100000; 1 [USP'U]/G; MG/G
OINTMENT TOPICAL
COMMUNITY
Start: 2022-04-25

## 2022-05-10 RX ORDER — SULFAMETHOXAZOLE AND TRIMETHOPRIM 800; 160 MG/1; MG/1
TABLET ORAL
COMMUNITY
Start: 2022-02-02

## 2022-05-10 RX ORDER — LEVOTHYROXINE SODIUM 200 MCG
TABLET ORAL
COMMUNITY
Start: 2022-03-08

## 2022-05-10 RX ORDER — INSULIN ASPART 100 [IU]/ML
INJECTION, SOLUTION INTRAVENOUS; SUBCUTANEOUS
COMMUNITY
Start: 2022-02-07

## 2022-05-10 RX ORDER — HYDROCHLOROTHIAZIDE 50 MG/1
TABLET ORAL
COMMUNITY
Start: 2021-01-23

## 2022-05-10 RX ORDER — ISOSORBIDE DINITRATE 30 MG/1
TABLET ORAL
COMMUNITY
Start: 2018-03-26

## 2022-05-10 RX ORDER — ACETAMINOPHEN 325 MG/1
TABLET ORAL
COMMUNITY
Start: 2018-03-26

## 2022-05-10 RX ORDER — VALSARTAN 40 MG/1
TABLET ORAL
COMMUNITY
Start: 2018-07-12

## 2022-05-10 RX ORDER — NITROGLYCERIN 0.4 MG/1
TABLET SUBLINGUAL
COMMUNITY
Start: 2022-03-10

## 2022-05-10 RX ORDER — ACETAZOLAMIDE 250 MG/1
TABLET ORAL
COMMUNITY
Start: 2018-06-19

## 2022-05-10 ASSESSMENT — ENCOUNTER SYMPTOMS
CHOKING: 0
TROUBLE SWALLOWING: 0
COLOR CHANGE: 0
VOMITING: 0
BACK PAIN: 0
PHOTOPHOBIA: 0
NAUSEA: 0
SHORTNESS OF BREATH: 0

## 2022-05-10 NOTE — PROGRESS NOTES
Subjective:      Patient ID: Lilian De Dios is a 78 y.o. female who presents today for:  Chief Complaint   Patient presents with    New Patient     Pt pati states that pt has been having a lot of difficulty with her memeory lately. SHe says that they can tell her something and then within minutes she will usualy forget again. HPI 33-year-old right-handed female was referred here for memory issues. Patient has significant short-term memory issues. Patient was seen with the family. He has had significant short-term memory issues with remembering even her appointments. Patient does function on her own but physically she is limited to perform her ADLs therefore functional status is difficult to ascertain. She significant cardiac issues and is on Cordarone as well as she has hypertension diabetes hypercholesterolemia. This all adds up to small vessel ischemic change. She does [place] any back pain or neck pain. In December she had a intracerebral hemorrhage after she fell she had a traumatic brain hemorrhage while she was in [place]. She was brought here and she had a repeat CT scan which did not show any further blood though the results cannot be ascertained for us as they are done at [facility]. She denies any numbness or tingling.     Past Medical History:   Diagnosis Date    Chronic kidney failure, stage 4 (severe) (HCC)     COPD (chronic obstructive pulmonary disease) (HCC)     High cholesterol     History of cancer of kidney in adulthood     Hypertension     Type 2 diabetes mellitus (HCC)      Past Surgical History:   Procedure Laterality Date    CARPAL TUNNEL RELEASE Bilateral     HYSTERECTOMY, TOTAL ABDOMINAL      KIDNEY REMOVAL      NECK SURGERY      herniated disc in neck    SHOULDER ARTHROPLASTY      TOTAL HIP ARTHROPLASTY      TOTAL KNEE ARTHROPLASTY      X2     Social History     Socioeconomic History    Marital status:      Spouse name: Not on file  Number of children: Not on file    Years of education: Not on file    Highest education level: Not on file   Occupational History    Not on file   Tobacco Use    Smoking status: Former Smoker    Smokeless tobacco: Never Used   Substance and Sexual Activity    Alcohol use: Not Currently    Drug use: Not Currently    Sexual activity: Not on file   Other Topics Concern    Not on file   Social History Narrative    Not on file     Social Determinants of Health     Financial Resource Strain:     Difficulty of Paying Living Expenses: Not on file   Food Insecurity:     Worried About [address] [employer] in the Last Year: Not on file    Tenisha of Food in the Last Year: Not on file   Transportation Needs:     Lack of Transportation (Medical): Not on file    Lack of Transportation (Non-Medical):  Not on file   Physical Activity:     Days of Exercise per Week: Not on file    Minutes of Exercise per Session: Not on file   Stress:     Feeling of Stress : Not on file   Social Connections:     Frequency of Communication with Friends and Family: Not on file    Frequency of Social Gatherings with Friends and Family: Not on file    Attends Islam Services: Not on file    Active Member of [address] [place] [address] or Organizations: Not on file    Attends Club or Organization Meetings: Not on file    Marital Status: Not on file   Intimate Partner Violence:     Fear of Current or Ex-Partner: Not on file    Emotionally Abused: Not on file    Physically Abused: Not on file    Sexually Abused: Not on file   Housing Stability:     Unable to Pay for Housing in the Last Year: Not on file    Number of [place] in the Last Year: Not on file    Unstable Housing in the Last Year: Not on file     Family History   Problem Relation Age of Onset    Hypertension Mother     Heart Failure Father     Cancer Sister      Not on File    Current Outpatient Medications   Medication Sig Dispense Refill    acetaminophen (TYLENOL) 325 MG tablet Take by mouth      acetaZOLAMIDE (DIAMOX) 250 MG tablet Take by mouth      allopurinol (ZYLOPRIM) 100 MG tablet TAKE 2 TABLETS BY MOUTH EVERY DAY      amiodarone (CORDARONE) 200 MG tablet Take by mouth      ELIQUIS 5 MG TABS tablet TAKE 1 TABLET BY MOUTH TWICE A DAY      Ascorbic Acid (VITAMIN C CR) 1000 MG TBCR Take by mouth      aspirin 81 MG EC tablet Take 81 mg by mouth daily      Aspirin Buf,CaCarb-MgCarb-MgO, 81 MG TABS Take by mouth      atorvastatin (LIPITOR) 40 MG tablet Take 40 mg by mouth daily      baclofen (LIORESAL) 10 MG tablet Take by mouth      benzonatate (TESSALON) 100 MG capsule Take by mouth      Blood Glucose Monitoring Suppl (ACCU-CHEK GUIDE) w/Device KIT USE AS DIRECTED      ciprofloxacin (CIPRO) 500 MG tablet Take by mouth      clobetasol (TEMOVATE) 0.05 % external solution Apply to the affected areas on scalp 1-2 times daily prn for itching.  clotrimazole-betamethasone (LOTRISONE) 1-0.05 % cream Clotrimazole-Betamethasone 1-0.05 % External Cream Quantity: 15 Refills: 0 Ordered: 16-Jul-2021 DO Start : 16-Jul-2021 Complete      colchicine (COLCRYS) 0.6 MG tablet Take by mouth      Cyanocobalamin 1000 MCG/15ML LIQD Take by mouth      dilTIAZem (TIAZAC) 120 MG extended release capsule Take by mouth      docusate (COLACE, DULCOLAX) 100 MG CAPS Take by mouth      epoetin adore (EPOGEN;PROCRIT) 35238 UNIT/ML injection Procrit 53622 UNIT/ML Injection Solution as directed Quantity: 0 Refills: 0 Ordered: 7-Feb-2022 DO Start : 7-Feb-2022 Active      flavoxATE (URISPAS) 100 MG tablet Take by mouth      fluconazole (DIFLUCAN) 200 MG tablet Take 200 mg by mouth daily      folic acid (FOLVITE) 1 MG tablet Take 1 mg by mouth daily      gabapentin (NEURONTIN) 300 MG capsule Take 300 mg by mouth every morning.  gentamicin (GARAMYCIN) 0.1 % cream Apply topically 2 times daily      Gabapentin Enacarbil  MG TBCR Take by mouth 3 times daily.      [address] strip TEST 3 TO 4 TIMES DAILY      hydroCHLOROthiazide (HYDRODIURIL) 50 MG tablet Take by mouth      hydrocortisone 2.5 % cream Hydrocortisone 2.5 % External Cream Take as directed Quantity: 0 Refills: 0 Ordered: 7-Feb-2022 DO Start : 7-Feb-2022 Active      insulin aspart (NOVOLOG) 100 UNIT/ML injection vial Inject into the skin      insulin detemir (LEVEMIR) 100 UNIT/ML injection vial Inject into the skin      insulin glargine (LANTUS) 100 UNIT/ML injection vial Inject into the skin      insulin lispro (HUMALOG KWIKPEN U-200) 200 UNIT/ML SOPN pen Inject into the skin      ipratropium-albuterol (DUONEB) 0.5-2.5 (3) MG/3ML SOLN nebulizer solution Inhale 3 mLs into the lungs      iron sucrose (VENOFER) 20 MG/ML injection Infuse intravenously      isosorbide dinitrate (ISORDIL) 30 MG tablet Take by mouth      ketoconazole (NIZORAL) 2 % cream MIX EQUAL PARTS TRIAMCINOLONE AND KETOCONAZOLE AND APPLY TWICE DAILY TO AFFECTED AREA      Lactobacillus TABS Take by mouth      levETIRAcetam (KEPPRA) 250 MG tablet Take 250 mg by mouth every 12 hours      levoFLOXacin (LEVAQUIN) 250 MG tablet Take by mouth      SYNTHROID 200 MCG tablet       linaclotide (LINZESS) 145 MCG capsule Take by mouth      linezolid (ZYVOX) 600 MG tablet Take by mouth      megestrol (MEGACE) 40 MG tablet Take by mouth      MENTHOL-ZINC OXIDE EX Apply topically      metoprolol tartrate (LOPRESSOR) 25 MG tablet       nitrofurantoin, macrocrystal-monohydrate, (MACROBID) 100 MG capsule TAKE 1 CAPSULE TWICE DAILY FOR 7 DAYS      nitroGLYCERIN (NITROSTAT) 0.4 MG SL tablet PLEASE SEE ATTACHED FOR DETAILED DIRECTIONS      nystatin (NYAMYC) 999927 UNIT/GM powder       nystatin-triamcinolone (MYCOLOG) 923973-4.1 UNIT/GM-% ointment APPLY TOICALLY TWICE A DAY TO RASH      pantoprazole (PROTONIX) 20 MG tablet Take 20 mg by mouth 2 times daily      KLOR-CON M20 20 MEQ extended release tablet       spironolactone (ALDACTONE) 25 MG tablet       sucralfate (CARAFATE) 1 GM tablet       sulfamethoxazole-trimethoprim (BACTRIM DS;SEPTRA DS) 800-160 MG per tablet       tolterodine (DETROL LA) 4 MG extended release capsule Take by mouth      torsemide (DEMADEX) 100 MG tablet       triamcinolone (KENALOG) 0.1 % cream MIX EQUAL PARTS TRIAMCINOLONE AND KETOCONAZOLE AND APPLY TWICE DAILY TO AFFECTED AREA      valsartan (DIOVAN) 40 MG tablet Take by mouth       No current facility-administered medications for this visit. Review of Systems   Constitutional: Negative for fever. HENT: Negative for ear pain, tinnitus and trouble swallowing. Eyes: Negative for photophobia and visual disturbance. Respiratory: Negative for choking and shortness of breath. Cardiovascular: Negative for chest pain and palpitations. Gastrointestinal: Negative for nausea and vomiting. Musculoskeletal: Negative for back pain, gait problem, joint swelling, myalgias, neck pain and neck stiffness. Skin: Negative for color change. Allergic/Immunologic: Negative for food allergies. Neurological: Negative for dizziness, tremors, seizures, syncope, facial asymmetry, speech difficulty, weakness, light-headedness, numbness and headaches. Psychiatric/Behavioral: Negative for behavioral problems, confusion, hallucinations and sleep disturbance. Objective:   /74 (Site: Left Upper Arm, Position: Sitting, Cuff Size: Medium Adult)   Pulse 72   Wt 198 lb (89.8 kg)     Physical Exam  Vitals reviewed. Eyes:      Pupils: Pupils are equal, round, and reactive to light. Cardiovascular:      Rate and Rhythm: Normal rate and regular rhythm. Heart sounds: No murmur heard. Pulmonary:      Effort: Pulmonary effort is normal.      Breath sounds: Normal breath sounds. Abdominal:      General: Bowel sounds are normal.   Musculoskeletal:         General: Normal range of motion. Cervical back: Normal range of motion. Skin:     General: Skin is warm. Neurological:      Mental Status: She is alert and oriented to person, place, and time. Cranial Nerves: No cranial nerve deficit. Sensory: No sensory deficit. Motor: No abnormal muscle tone. Coordination: Coordination normal.      Deep Tendon Reflexes: Reflexes are normal and symmetric. Babinski sign absent on the right side. Babinski sign absent on the left side. Psychiatric:         Mood and Affect: Mood normal.     In his overall strength of 4/ 5. She is hyperreflexic throughout with absent ankle reflexes and her Mini-Mental examination score is 28 she has a gait ataxia walks with a cane    No results found. Lab Results   Component Value Date    WBC 9.8 03/07/2019    WBC 8.0 06/16/2015    RBC 4.08 03/07/2019    HGB 11.7 03/07/2019    HCT 37.9 03/07/2019    MCV 92.9 03/07/2019    MCH 28.7 03/07/2019    MCHC 30.9 03/07/2019    RDW 21.6 06/16/2015     03/07/2019    MPV 11.0 03/07/2019     Lab Results   Component Value Date     06/16/2015    K 4.7 06/16/2015    CL 97 06/16/2015    CO2 28 06/16/2015    BUN 18 06/16/2015    CREATININE 0.82 06/16/2015    GFRAA >60.0 06/16/2015    LABGLOM >60.0 06/16/2015    GLUCOSE 111 06/16/2015    CALCIUM 8.7 06/16/2015     Lab Results   Component Value Date    PROTIME 17.4 06/25/2015    INR 1.6 06/25/2015     No results found for: TSH, HUZANOBU52, FOLATE, FERRITIN, IRON, TIBC, PTRFSAT, RETICCOUNT, TSH, FREET4  No results found for: TRIG, HDL, LDLCALC, LDLDIRECT, LABVLDL  No results found for: LABAMPH, BARBSCNU, LABBENZ, CANNAB, COCAINESCRN, LABMETH, OPIATESCREENURINE, PHENCYCLIDINESCREENURINE, PPXUR, ETOH  No results found for: LITHIUM, DILFRTOT, VALPROATE    Assessment:       Diagnosis Orders   1. Vascular dementia without behavioral disturbance (HCC)     2. Closed head injury, initial encounter     3. Ataxia     4. MCI (mild cognitive impairment)     5. General weakness     Vascular dementia with a fluctuating course.   Patient's Mini-Mental examination score is 28 though she has considerable short-term memory issues. Her functional status cannot be ascertained for her ADLs as she is limited due to her physical ailments and is not able to do much. At this time recommended an MRI of the brain to see what the accumulation of vasculopathy is and make sure there is no hydrocephalus after her closed head injury that occurred in [place] in December. Mixed dementia can exist which can be very difficult to sort out. There is no one good treatment for any of these disorders though we will consider Namenda or Aricept after cardiac work-up is done. She does have history of atrial fibrillation continues on Eliquis. Findings to be discussed with the daughter and that she will keep an eye on her functional status to guide us further in our assessment and management      Plan:      No orders of the defined types were placed in this encounter. No orders of the defined types were placed in this encounter. No follow-ups on file.       Erica Acosta MD

## 2022-05-28 LAB — VITAMIN D 25-HYDROXY: 40 NG/ML

## 2023-01-01 ENCOUNTER — NURSING HOME VISIT (OUTPATIENT)
Dept: POST ACUTE CARE | Facility: EXTERNAL LOCATION | Age: 83
End: 2023-01-01
Payer: MEDICARE

## 2023-01-01 DIAGNOSIS — E10.9 TYPE 1 DIABETES MELLITUS WITHOUT COMPLICATION (MULTI): ICD-10-CM

## 2023-01-01 DIAGNOSIS — E10.649 TYPE 1 DIABETES MELLITUS WITH HYPOGLYCEMIA AND WITHOUT COMA (MULTI): Primary | ICD-10-CM

## 2023-01-01 DIAGNOSIS — N18.32 STAGE 3B CHRONIC KIDNEY DISEASE (MULTI): ICD-10-CM

## 2023-01-01 DIAGNOSIS — C64.9: Primary | ICD-10-CM

## 2023-01-01 DIAGNOSIS — C64.9: ICD-10-CM

## 2023-01-01 DIAGNOSIS — I50.32 CHRONIC DIASTOLIC CONGESTIVE HEART FAILURE (MULTI): ICD-10-CM

## 2023-01-01 DIAGNOSIS — C78.89: ICD-10-CM

## 2023-01-01 DIAGNOSIS — E03.8 OTHER SPECIFIED HYPOTHYROIDISM: ICD-10-CM

## 2023-01-01 DIAGNOSIS — I48.0 PAROXYSMAL ATRIAL FIBRILLATION (MULTI): ICD-10-CM

## 2023-01-01 DIAGNOSIS — C78.89: Primary | ICD-10-CM

## 2023-01-01 DIAGNOSIS — Z95.2 HISTORY OF TRANSCATHETER AORTIC VALVE REPLACEMENT (TAVR): ICD-10-CM

## 2023-01-01 DIAGNOSIS — I25.10 CORONARY ARTERY DISEASE INVOLVING NATIVE CORONARY ARTERY OF NATIVE HEART WITHOUT ANGINA PECTORIS: ICD-10-CM

## 2023-01-01 DIAGNOSIS — I10 ESSENTIAL HYPERTENSION: ICD-10-CM

## 2023-01-01 DIAGNOSIS — E16.2 HYPOGLYCEMIA: Primary | ICD-10-CM

## 2023-01-01 DIAGNOSIS — D62 ANEMIA DUE TO ACUTE BLOOD LOSS: ICD-10-CM

## 2023-01-01 DIAGNOSIS — R74.01 TRANSAMINITIS: ICD-10-CM

## 2023-01-01 LAB
ABO GROUP (TYPE) IN BLOOD: NORMAL
ALANINE AMINOTRANSFERASE (SGPT) (U/L) IN SER/PLAS: 23 U/L (ref 7–45)
ALBUMIN (G/DL) IN SER/PLAS: 3.7 G/DL (ref 3.4–5)
ALKALINE PHOSPHATASE (U/L) IN SER/PLAS: 104 U/L (ref 33–136)
ANION GAP IN SER/PLAS: 12 MMOL/L (ref 10–20)
ANTIBODY SCREEN: NORMAL
ASPARTATE AMINOTRANSFERASE (SGOT) (U/L) IN SER/PLAS: 22 U/L (ref 9–39)
BASOPHILS (10*3/UL) IN BLOOD BY AUTOMATED COUNT: 0.06 X10E9/L (ref 0–0.1)
BASOPHILS/100 LEUKOCYTES IN BLOOD BY AUTOMATED COUNT: 0.8 % (ref 0–2)
BILIRUBIN DIRECT (MG/DL) IN SER/PLAS: 0.1 MG/DL (ref 0–0.3)
BILIRUBIN TOTAL (MG/DL) IN SER/PLAS: 0.4 MG/DL (ref 0–1.2)
CALCIUM (MG/DL) IN SER/PLAS: 10.1 MG/DL (ref 8.6–10.3)
CARBON DIOXIDE, TOTAL (MMOL/L) IN SER/PLAS: 34 MMOL/L (ref 21–32)
CHLORIDE (MMOL/L) IN SER/PLAS: 96 MMOL/L (ref 98–107)
CHOLESTEROL (MG/DL) IN SER/PLAS: 177 MG/DL (ref 0–199)
CHOLESTEROL IN HDL (MG/DL) IN SER/PLAS: 37.4 MG/DL
CHOLESTEROL/HDL RATIO: 4.7
COBALAMIN (VITAMIN B12) (PG/ML) IN SER/PLAS: 866 PG/ML (ref 211–911)
CREATININE (MG/DL) IN SER/PLAS: 1.52 MG/DL (ref 0.5–1.05)
EOSINOPHILS (10*3/UL) IN BLOOD BY AUTOMATED COUNT: 0.22 X10E9/L (ref 0–0.4)
EOSINOPHILS/100 LEUKOCYTES IN BLOOD BY AUTOMATED COUNT: 2.9 % (ref 0–6)
ERYTHROCYTE DISTRIBUTION WIDTH (RATIO) BY AUTOMATED COUNT: 14.7 % (ref 11.5–14.5)
ERYTHROCYTE MEAN CORPUSCULAR HEMOGLOBIN CONCENTRATION (G/DL) BY AUTOMATED: 31.4 G/DL (ref 32–36)
ERYTHROCYTE MEAN CORPUSCULAR VOLUME (FL) BY AUTOMATED COUNT: 100 FL (ref 80–100)
ERYTHROCYTES (10*6/UL) IN BLOOD BY AUTOMATED COUNT: 3.93 X10E12/L (ref 4–5.2)
ESTIMATED AVERAGE GLUCOSE FOR HBA1C: 148 MG/DL
FOLATE (NG/ML) IN SER/PLAS: 7.6 NG/ML
GFR FEMALE: 34 ML/MIN/1.73M2
GLUCOSE (MG/DL) IN SER/PLAS: 187 MG/DL (ref 74–99)
HEMATOCRIT (%) IN BLOOD BY AUTOMATED COUNT: 39.2 % (ref 36–46)
HEMOGLOBIN (G/DL) IN BLOOD: 12.3 G/DL (ref 12–16)
HEMOGLOBIN A1C/HEMOGLOBIN TOTAL IN BLOOD: 6.8 %
IMMATURE GRANULOCYTES/100 LEUKOCYTES IN BLOOD BY AUTOMATED COUNT: 0.9 % (ref 0–0.9)
IRON (UG/DL) IN SER/PLAS: 79 UG/DL (ref 35–150)
LDL: 113 MG/DL (ref 0–99)
LEUKOCYTES (10*3/UL) IN BLOOD BY AUTOMATED COUNT: 7.7 X10E9/L (ref 4.4–11.3)
LYMPHOCYTES (10*3/UL) IN BLOOD BY AUTOMATED COUNT: 1.07 X10E9/L (ref 0.8–3)
LYMPHOCYTES/100 LEUKOCYTES IN BLOOD BY AUTOMATED COUNT: 13.9 % (ref 13–44)
MONOCYTES (10*3/UL) IN BLOOD BY AUTOMATED COUNT: 0.65 X10E9/L (ref 0.05–0.8)
MONOCYTES/100 LEUKOCYTES IN BLOOD BY AUTOMATED COUNT: 8.4 % (ref 2–10)
NEUTROPHILS (10*3/UL) IN BLOOD BY AUTOMATED COUNT: 5.64 X10E9/L (ref 1.6–5.5)
NEUTROPHILS/100 LEUKOCYTES IN BLOOD BY AUTOMATED COUNT: 73.1 % (ref 40–80)
PLATELETS (10*3/UL) IN BLOOD AUTOMATED COUNT: 126 X10E9/L (ref 150–450)
POTASSIUM (MMOL/L) IN SER/PLAS: 3.8 MMOL/L (ref 3.5–5.3)
PROTEIN TOTAL: 6.7 G/DL (ref 6.4–8.2)
RH FACTOR: NORMAL
SARS-COV-2 RESULT: NOT DETECTED
SODIUM (MMOL/L) IN SER/PLAS: 138 MMOL/L (ref 136–145)
THYROTROPIN (MIU/L) IN SER/PLAS BY DETECTION LIMIT <= 0.05 MIU/L: 1.92 MIU/L (ref 0.44–3.98)
TRIGLYCERIDE (MG/DL) IN SER/PLAS: 133 MG/DL (ref 0–149)
UREA NITROGEN (MG/DL) IN SER/PLAS: 53 MG/DL (ref 6–23)
VLDL: 27 MG/DL (ref 0–40)

## 2023-01-01 PROCEDURE — 99306 1ST NF CARE HIGH MDM 50: CPT | Performed by: FAMILY MEDICINE

## 2023-01-01 PROCEDURE — 99309 SBSQ NF CARE MODERATE MDM 30: CPT | Performed by: NURSE PRACTITIONER

## 2023-01-01 PROCEDURE — 99310 SBSQ NF CARE HIGH MDM 45: CPT | Performed by: NURSE PRACTITIONER

## 2023-01-01 ASSESSMENT — ENCOUNTER SYMPTOMS
NAUSEA: 0
PALPITATIONS: 0
ABDOMINAL PAIN: 0
PALPITATIONS: 0
FEVER: 0
COUGH: 0
VOMITING: 0
ABDOMINAL PAIN: 0
SHORTNESS OF BREATH: 0
ABDOMINAL DISTENTION: 0
FATIGUE: 1
CONSTIPATION: 0
COUGH: 0
SHORTNESS OF BREATH: 0
FATIGUE: 0
CONSTIPATION: 0
FEVER: 0

## 2023-05-16 PROBLEM — I25.10 CAD (CORONARY ARTERY DISEASE), NATIVE CORONARY ARTERY: Status: ACTIVE | Noted: 2023-01-01

## 2023-05-16 PROBLEM — C18.4 MALIGNANT NEOPLASM OF TRANSVERSE COLON (MULTI): Status: ACTIVE | Noted: 2023-01-01

## 2023-05-16 PROBLEM — C64.9: Status: ACTIVE | Noted: 2023-01-01

## 2023-05-16 PROBLEM — I45.10 COMPLETE RIGHT BUNDLE BRANCH BLOCK (RBBB): Status: ACTIVE | Noted: 2023-01-01

## 2023-05-16 PROBLEM — I34.0 MITRAL REGURGITATION: Status: ACTIVE | Noted: 2023-01-01

## 2023-05-16 PROBLEM — C78.89: Status: ACTIVE | Noted: 2023-01-01

## 2023-05-16 PROBLEM — I07.1 TRICUSPID REGURGITATION: Status: ACTIVE | Noted: 2023-01-01

## 2023-05-16 PROBLEM — Z95.1 HISTORY OF CORONARY ARTERY BYPASS GRAFT: Status: ACTIVE | Noted: 2023-01-01

## 2023-05-16 PROBLEM — Z96.619 STATUS POST SHOULDER REPLACEMENT: Status: ACTIVE | Noted: 2023-01-01

## 2023-05-16 PROBLEM — G47.33 OBSTRUCTIVE SLEEP APNEA: Status: ACTIVE | Noted: 2023-01-01

## 2023-05-16 PROBLEM — E78.2 MIXED HYPERLIPIDEMIA: Status: ACTIVE | Noted: 2023-01-01

## 2023-05-16 PROBLEM — M19.011 ARTHRITIS OF SHOULDER REGION, RIGHT, DEGENERATIVE: Status: ACTIVE | Noted: 2023-01-01

## 2023-05-16 PROBLEM — Z95.2 HISTORY OF TRANSCATHETER AORTIC VALVE REPLACEMENT (TAVR): Status: ACTIVE | Noted: 2023-01-01

## 2023-05-16 PROBLEM — I35.0 AORTIC VALVE STENOSIS: Status: RESOLVED | Noted: 2023-01-01 | Resolved: 2023-01-01

## 2023-05-16 PROBLEM — I10 ESSENTIAL HYPERTENSION: Status: ACTIVE | Noted: 2023-01-01

## 2023-05-16 PROBLEM — D64.9 ANEMIA: Status: ACTIVE | Noted: 2023-01-01

## 2023-05-16 PROBLEM — N18.32 STAGE 3B CHRONIC KIDNEY DISEASE (MULTI): Status: ACTIVE | Noted: 2023-01-01

## 2023-05-16 PROBLEM — D62 ANEMIA DUE TO ACUTE BLOOD LOSS: Status: ACTIVE | Noted: 2023-01-01

## 2023-05-16 PROBLEM — G56.03 BILATERAL CARPAL TUNNEL SYNDROME: Status: ACTIVE | Noted: 2023-01-01

## 2023-05-16 PROBLEM — N18.31 STAGE 3A CHRONIC KIDNEY DISEASE (MULTI): Status: ACTIVE | Noted: 2023-01-01

## 2023-05-16 PROBLEM — E03.8 OTHER SPECIFIED HYPOTHYROIDISM: Status: ACTIVE | Noted: 2023-01-01

## 2023-05-16 PROBLEM — I27.20 PULMONARY HYPERTENSION (MULTI): Status: ACTIVE | Noted: 2023-01-01

## 2023-05-16 PROBLEM — J44.9 COPD (CHRONIC OBSTRUCTIVE PULMONARY DISEASE) (MULTI): Status: ACTIVE | Noted: 2023-01-01

## 2023-05-16 PROBLEM — I87.2 CHRONIC VENOUS INSUFFICIENCY: Status: ACTIVE | Noted: 2023-01-01

## 2023-05-16 PROBLEM — E10.9 TYPE 1 DIABETES MELLITUS WITHOUT COMPLICATION (MULTI): Status: ACTIVE | Noted: 2023-01-01

## 2023-05-16 PROBLEM — I35.0 AORTIC VALVE STENOSIS: Status: ACTIVE | Noted: 2023-01-01

## 2023-05-16 PROBLEM — E03.9 HYPOTHYROID: Status: ACTIVE | Noted: 2023-01-01

## 2023-05-16 PROBLEM — I50.30 DIASTOLIC CONGESTIVE HEART FAILURE (MULTI): Status: ACTIVE | Noted: 2023-01-01

## 2023-05-16 PROBLEM — I48.0 PAROXYSMAL ATRIAL FIBRILLATION (MULTI): Status: ACTIVE | Noted: 2023-01-01

## 2023-05-16 NOTE — LETTER
Patient: Christy Whitman  : 1940    Encounter Date: 2023    Visit  Note   Subjective  Christy Whitman is a 82 y.o. female who is being seen and evaluated for multiple medical problems. Nursing notes, vital signs, and labs were reviewed in the local facility chart.  She ws admitted to the hospital with renal cell carcinoma metastatic.  She had elective pancreatectomy, elisabeth, splenectomy. liver biopsy .  Recovery complicated by leukocytosis for which she was treated empirically with levaquin but CT sacn was negative and blood cx neg so she was transitioned to oral abx and sent here for PT/OT and supportive care and wound care. Extended DVT prophylaxis was recommended  HPI   Objective  There were no vitals taken for this visit.  Physical Exam  Constitutional:       Appearance: Normal appearance.   HENT:      Head: Normocephalic.   Eyes:      Conjunctiva/sclera: Conjunctivae normal.   Cardiovascular:      Rate and Rhythm: Normal rate. Rhythm irregular.   Pulmonary:      Effort: Pulmonary effort is normal.      Breath sounds: Normal breath sounds.   Abdominal:      General: Bowel sounds are normal.      Palpations: Abdomen is soft.      Comments: Very large  Abdominal incision is clean and dry was sutures intact.  There is moderate amount of bruising surrounding the incision but no hematoma and no drainage   Musculoskeletal:      Cervical back: Neck supple.   Skin:     General: Skin is warm and dry.   Neurological:      Mental Status: She is alert.       Assessment/Plan  Problem List Items Addressed This Visit       Coronary artery disease involving native coronary artery of native heart without angina pectoris     S/p CABG and 9 stents. We will contineu secondary prevention with statin and bblocker therapy         Other specified hypothyroidism     TSH was therapeutic at 1.92 weeks ago. We will continue her current thyroid supplement         Paroxysmal atrial fibrillation (CMS/HCC)     We will  continue rate control with beta-blocker and permanent Eliquis therapy for prophylaxis         Stage 3b chronic kidney disease     GFR on admission was 41 with a creatinine 1.28.  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  She originally had a left nephrectomy but then the metastases were treated with a splenectomy, pancreatectomy, and cholecystectomy with a liver biopsy.  She had a significant postoperative anemia and her anticoagulation has been on hold.  Hospital instructions advised to hold the anticoagulation until she is seen in follow-up by her surgeon who will verify that she is at reduced risk for internal hemorrhage at which point we can resume the Eliquis.  She was also treated empirically for a postoperative leukocytosis although cultures were all negative.  She has now completed a 7-day course of IV and then oral Levaquin which we can discontinue at this point.         Type 1 diabetes mellitus without complication (CMS/HCC)     S/p pancreatectomy and now on long acting/short acting insulin.  We will contineu the regimen form the hospital and monitor for any need to adjust               Electronically Signed By: Kannan Recinos MD   5/16/23  4:25 PM

## 2023-05-16 NOTE — ASSESSMENT & PLAN NOTE
GFR on admission was 41 with a creatinine 1.28.  We will continue to monitor this closely weekly metabolic panels

## 2023-05-16 NOTE — ASSESSMENT & PLAN NOTE
Hgb 8.0 today on admission.  She ahd IV venofer in the hosptial and we will monitor weekly hgb while here

## 2023-05-16 NOTE — PROGRESS NOTES
Visit  Note   Subjective   Christy Whitman is a 82 y.o. female who is being seen and evaluated for multiple medical problems. Nursing notes, vital signs, and labs were reviewed in the local facility chart.  She ws admitted to the hospital with renal cell carcinoma metastatic.  She had elective pancreatectomy, elisabeth, splenectomy. liver biopsy 5/8.  Recovery complicated by leukocytosis for which she was treated empirically with levaquin but CT sacn was negative and blood cx neg so she was transitioned to oral abx and sent here for PT/OT and supportive care and wound care. Extended DVT prophylaxis was recommended  HPI   Objective   There were no vitals taken for this visit.  Physical Exam  Constitutional:       Appearance: Normal appearance.   HENT:      Head: Normocephalic.   Eyes:      Conjunctiva/sclera: Conjunctivae normal.   Cardiovascular:      Rate and Rhythm: Normal rate. Rhythm irregular.   Pulmonary:      Effort: Pulmonary effort is normal.      Breath sounds: Normal breath sounds.   Abdominal:      General: Bowel sounds are normal.      Palpations: Abdomen is soft.      Comments: Very large  Abdominal incision is clean and dry was sutures intact.  There is moderate amount of bruising surrounding the incision but no hematoma and no drainage   Musculoskeletal:      Cervical back: Neck supple.   Skin:     General: Skin is warm and dry.   Neurological:      Mental Status: She is alert.       Assessment/Plan   Problem List Items Addressed This Visit       Coronary artery disease involving native coronary artery of native heart without angina pectoris     S/p CABG and 9 stents. We will contineu secondary prevention with statin and bblocker therapy         Other specified hypothyroidism     TSH was therapeutic at 1.92 weeks ago. We will continue her current thyroid supplement         Paroxysmal atrial fibrillation (CMS/HCC)     We will continue rate control with beta-blocker and permanent Eliquis therapy for  prophylaxis         Stage 3b chronic kidney disease     GFR on admission was 41 with a creatinine 1.28.  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  She originally had a left nephrectomy but then the metastases were treated with a splenectomy, pancreatectomy, and cholecystectomy with a liver biopsy.  She had a significant postoperative anemia and her anticoagulation has been on hold.  Hospital instructions advised to hold the anticoagulation until she is seen in follow-up by her surgeon who will verify that she is at reduced risk for internal hemorrhage at which point we can resume the Eliquis.  She was also treated empirically for a postoperative leukocytosis although cultures were all negative.  She has now completed a 7-day course of IV and then oral Levaquin which we can discontinue at this point.         Type 1 diabetes mellitus without complication (CMS/HCC)     S/p pancreatectomy and now on long acting/short acting insulin.  We will contineu the regimen form the hospital and monitor for any need to adjust

## 2023-05-16 NOTE — ASSESSMENT & PLAN NOTE
This 82-year-old female underwent radical surgical excision.  She originally had a left nephrectomy but then the metastases were treated with a splenectomy, pancreatectomy, and cholecystectomy with a liver biopsy.  She had a significant postoperative anemia and her anticoagulation has been on hold.  Hospital instructions advised to hold the anticoagulation until she is seen in follow-up by her surgeon who will verify that she is at reduced risk for internal hemorrhage at which point we can resume the Eliquis.  She was also treated empirically for a postoperative leukocytosis although cultures were all negative.  She has now completed a 7-day course of IV and then oral Levaquin which we can discontinue at this point.

## 2023-05-16 NOTE — ASSESSMENT & PLAN NOTE
S/p pancreatectomy and now on long acting/short acting insulin.  We will contineu the regimen form the hospital and monitor for any need to adjust

## 2023-05-18 NOTE — PROGRESS NOTES
MRN:[id number]     Subjective   Christy Whitman is a 82 y.o. female who is seen today for routine followup of multiple medical problems.  She was recently hospitalized for  appointment and is now s/p  metastatic renal cell carcinoma and is now s/p cholecystectomy, pancreatectomy, and splenectomy.  Nursing requested this visit due to recurrent episodes of hypoglycemia since arrival to SNF as low as 52.  She has not been symptomatic when blood sugars are low.  Appetite is good according to nurse.  HPI    Review of Systems   Constitutional:  Negative for fatigue and fever.   Respiratory:  Negative for cough and shortness of breath.    Cardiovascular:  Positive for leg swelling. Negative for chest pain and palpitations.   Gastrointestinal:  Negative for abdominal distention, abdominal pain, constipation, nausea and vomiting.        Objective   Lab Results   Component Value Date    GLUCOSE 93 05/15/2023    CALCIUM 8.4 (L) 05/15/2023     (L) 05/15/2023    K 3.7 05/15/2023    CO2 28 05/15/2023    CL 98 05/15/2023    BUN 34 (H) 05/15/2023    CREATININE 1.25 (H) 05/15/2023      Lab Results   Component Value Date    WBC 17.0 (H) 05/16/2023    HGB 8.0 (L) 05/16/2023    HCT 26.1 (L) 05/16/2023    MCV 99 05/16/2023     05/16/2023        Physical Exam  Constitutional:       Appearance: She is obese.   HENT:      Head: Normocephalic.   Cardiovascular:      Rate and Rhythm: Normal rate and regular rhythm.   Pulmonary:      Effort: Pulmonary effort is normal.      Breath sounds: Normal breath sounds.   Abdominal:      General: Bowel sounds are decreased.          Comments: Midline abdominal incision w/ staples intact and mild erythema at distal end. No dehiscence or drainage. Also has 3 bumpers on each side of  midline incision.     Very hypoactive bowel sounds in all quadrants, but pt had 2 BMs today.   Genitourinary:     Comments: Catheter draining clear yellow urine.  Musculoskeletal:      Right lower leg: 3+ Edema  present.      Left lower leg: 3+ Edema present.   Neurological:      Mental Status: She is alert.     Assessment    1. Hypoglycemia      Frequent low blood sugar of 52, 54, 60, 84 since arrival to SNF.  The highest reading was 249 after giving orange juice for an episode of hypoglycemia.      2. Type 1 diabetes mellitus without complication (CMS/[facility])      Decrease detemir insulin to 35 units nightly.  Decrease Humalog to 17 units 3 times daily with meals.  Hold Humalog if blood sugar less than 80 prior to meal.      3. 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 Chronic diastolic congestive heart failure (CMS/[facility])      Worsening BLE edema.  Add metolazone 2.5 mg p.o. at 6 AM daily x4 days.  Continue torsemide 100 mg and Aldactone 25 mg daily.       Repeat CBC and BMP 5/23/23.  Nursing to monitor bowel sound daily and monitor for abdominal distention.     Meds, orders, nursing notes reviewed.  Nurse will monitor and update physician of any change: Discussed with patient and provided information.  Supportive care will be given.  Follow-up in 1 week or earlier as needed.

## 2023-05-18 NOTE — LETTER
Patient: Christy Whitman  : 1940    Encounter Date: 2023    MRN:[id number]     Subjective  Christy Whitman is a 82 y.o. female who is seen today for routine followup of multiple medical problems.  She was recently hospitalized for  appointment and is now s/p  metastatic renal cell carcinoma and is now s/p cholecystectomy, pancreatectomy, and splenectomy.  Nursing requested this visit due to recurrent episodes of hypoglycemia since arrival to SNF as low as 52.  She has not been symptomatic when blood sugars are low.  Appetite is good according to nurse.  HPI    Review of Systems   Constitutional:  Negative for fatigue and fever.   Respiratory:  Negative for cough and shortness of breath.    Cardiovascular:  Positive for leg swelling. Negative for chest pain and palpitations.   Gastrointestinal:  Negative for abdominal distention, abdominal pain, constipation, nausea and vomiting.        Objective  Lab Results   Component Value Date    GLUCOSE 93 05/15/2023    CALCIUM 8.4 (L) 05/15/2023     (L) 05/15/2023    K 3.7 05/15/2023    CO2 28 05/15/2023    CL 98 05/15/2023    BUN 34 (H) 05/15/2023    CREATININE 1.25 (H) 05/15/2023      Lab Results   Component Value Date    WBC 17.0 (H) 2023    HGB 8.0 (L) 2023    HCT 26.1 (L) 2023    MCV 99 2023     2023        Physical Exam  Constitutional:       Appearance: She is obese.   HENT:      Head: Normocephalic.   Cardiovascular:      Rate and Rhythm: Normal rate and regular rhythm.   Pulmonary:      Effort: Pulmonary effort is normal.      Breath sounds: Normal breath sounds.   Abdominal:      General: Bowel sounds are decreased.          Comments: Midline abdominal incision w/ staples intact and mild erythema at distal end. No dehiscence or drainage. Also has 3 bumpers on each side of  midline incision.     Very hypoactive bowel sounds in all quadrants, but pt had 2 BMs today.   Genitourinary:     Comments: Catheter  draining clear yellow urine.  Musculoskeletal:      Right lower leg: 3+ Edema present.      Left lower leg: 3+ Edema present.   Neurological:      Mental Status: She is alert.     Assessment   1. Hypoglycemia      Frequent low blood sugar of 52, 54, 60, 84 since arrival to SNF.  The highest reading was 249 after giving orange juice for an episode of hypoglycemia.      2. Type 1 diabetes mellitus without complication (CMS/HCC)      Decrease detemir insulin to 35 units nightly.  Decrease Humalog to 17 units 3 times daily with meals.  Hold Humalog if blood sugar less than 80 prior to meal.      3. 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 Chronic diastolic congestive heart failure (CMS/HCC)      Worsening BLE edema.  Add metolazone 2.5 mg p.o. at 6 AM daily x4 days.  Continue torsemide 100 mg and Aldactone 25 mg daily.       Repeat CBC and BMP 5/23/23.  Nursing to monitor bowel sound daily and monitor for abdominal distention.     Meds, orders, nursing notes reviewed.  Nurse will monitor and update physician of any change: Discussed with patient and provided information.  Supportive care will be given.  Follow-up in 1 week or earlier as needed.       Electronically Signed By: BONITA Michel   5/18/23  7:45 PM

## 2023-05-24 PROBLEM — E10.649: Status: ACTIVE | Noted: 2023-01-01

## 2023-05-24 NOTE — PROGRESS NOTES
MRN:[id number]     Galen Whitman is a 82 y.o. female who is seen today for hospital follow-up.  She was recently admitted to  from 5/8 through 5/16/2023 for pancreatic mass.  She is now s/p open distal pancreatectomy, splenectomy, and open cholecystectomy.  She was discharged to SNF on 5/16/2023.  During her SNF stay she had recurrent episodes of hypoglycemia despite decreasing doses of her insulin.  She was readmitted to [facility] on 5/19/2023 again for hypoglycemia and a fall.  She was then transferred to [facility] for endocrinology support where her meds were adjusted.  Patient was then DC'd to [facility] on 5/24/2023 for PT/OT/SN.    Review of Systems   Constitutional:  Positive for fatigue. Negative for fever.   Respiratory:  Negative for cough and shortness of breath.    Cardiovascular:  Negative for chest pain, palpitations and leg swelling.   Gastrointestinal:  Negative for abdominal pain and constipation.        Objective   Lab Results   Component Value Date    GLUCOSE 185 (H) 05/24/2023    CALCIUM 9.0 05/24/2023     05/24/2023    K 3.5 05/24/2023    CO2 34 (H) 05/24/2023    CL 93 (L) 05/24/2023    BUN 52 (H) 05/24/2023    CREATININE 1.58 (H) 05/24/2023      Lab Results   Component Value Date    WBC 11.6 (H) 05/24/2023    HGB 7.6 (L) 05/24/2023    HCT 24.6 (L) 05/24/2023    MCV 97 05/24/2023     (H) 05/24/2023     Lab Results   Component Value Date    TSH 8.32 (H) 05/19/2023    [id number] 10.0 06/26/2022         Physical Exam  Constitutional:       Appearance: She is obese.   HENT:      Head: Normocephalic.   Cardiovascular:      Rate and Rhythm: Normal rate and regular rhythm.   Pulmonary:      Effort: Pulmonary effort is normal.      Breath sounds: Normal breath sounds.   Abdominal:      General: Bowel sounds are normal.      Palpations: Abdomen is soft.      Tenderness: There is no abdominal tenderness.      Comments: Midline abdominal incision BERTHA and well-approximated.  Staples and bumpers have been removed since last admission to NH. Surrounding area has scattered ecchymosis, but no areas of erythema, warmth, or drainage from incision.    Genitourinary:     Comments: Catheter draining clear yellow urine.  Musculoskeletal:      Right lower leg: No edema.      Left lower leg: No edema.   Neurological:      General: No focal deficit present.      Mental Status: She is alert and oriented to person, place, and time.     Assessment    1. Type 1 diabetes mellitus with hypoglycemia and without coma (CMS/HCC)      Hospital decreased Lantus to 10units,  Humalog 2 units TID w/ meals plus mod.dose SSI.    now. Will give 1x 10 units Humalog now. Add SSI coverage at HS.      2. Anemia due to acute blood loss      Hgb downtrending slowly, 7.6 on 5/24. Repeat CBC 5/30/23.      3. 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 Transaminitis      Uptrending: ALT 60, AST 59. total bili and alk phos WNL. Will repeat CMP and PT/PTT 5/30/23.      5. Stage 3b chronic kidney disease        6. Chronic diastolic congestive heart failure (CMS/HCC)      Currently euvolemic after 5-day addition of metolazone. Continue Torsemide 100mg QD.      7. Other specified hypothyroidism      On levothyroxine 200mcg. Repeat TSH and free T4 in 4 weeks.      8. Paroxysmal atrial fibrillation (CMS/HCC)      Rate controlled on bblocker. not on DOAC d/t ABLA. On carafate and protonix 40mg BID.      9. Essential hypertension      Currently well controlled. Continue bblocker, torsemide, spironolactone      10. History of transcatheter aortic valve replacement (TAVR)             Meds, orders, nursing notes reviewed.  Nurse will monitor and update physician of any change: Discussed with patient and provided information.  Supportive care will be given.  Dr. Recinos will follow-up next week.

## 2023-05-25 NOTE — LETTER
Patient: Christy Whitman  : 1940    Encounter Date: 2023    MRN:[id number]     Subjective  Christy Whitman is a 82 y.o. female who is seen today for hospital follow-up.  She was recently admitted to  from  through 2023 for pancreatic mass.  She is now s/p open distal pancreatectomy, splenectomy, and open cholecystectomy.  She was discharged to SNF on 2023.  During her SNF stay she had recurrent episodes of hypoglycemia despite decreasing doses of her insulin.  She was readmitted to [facility] on 2023 again for hypoglycemia and a fall.  She was then transferred to [facility] for endocrinology support where her meds were adjusted.  Patient was then DC'd to [facility] on 2023 for PT/OT/SN.    Review of Systems   Constitutional:  Positive for fatigue. Negative for fever.   Respiratory:  Negative for cough and shortness of breath.    Cardiovascular:  Negative for chest pain, palpitations and leg swelling.   Gastrointestinal:  Negative for abdominal pain and constipation.        Objective  Lab Results   Component Value Date    GLUCOSE 185 (H) 2023    CALCIUM 9.0 2023     2023    K 3.5 2023    CO2 34 (H) 2023    CL 93 (L) 2023    BUN 52 (H) 2023    CREATININE 1.58 (H) 2023      Lab Results   Component Value Date    WBC 11.6 (H) 2023    HGB 7.6 (L) 2023    HCT 24.6 (L) 2023    MCV 97 2023     (H) 2023     Lab Results   Component Value Date    TSH 8.32 (H) 2023    [id number] 10.0 2022         Physical Exam  Constitutional:       Appearance: She is obese.   HENT:      Head: Normocephalic.   Cardiovascular:      Rate and Rhythm: Normal rate and regular rhythm.   Pulmonary:      Effort: Pulmonary effort is normal.      Breath sounds: Normal breath sounds.   Abdominal:      General: Bowel sounds are normal.      Palpations: Abdomen is soft.      Tenderness: There is no abdominal  tenderness.      Comments: Midline abdominal incision BERTHA and well-approximated. Staples and bumpers have been removed since last admission to NH. Surrounding area has scattered ecchymosis, but no areas of erythema, warmth, or drainage from incision.    Genitourinary:     Comments: Catheter draining clear yellow urine.  Musculoskeletal:      Right lower leg: No edema.      Left lower leg: No edema.   Neurological:      General: No focal deficit present.      Mental Status: She is alert and oriented to person, place, and time.     Assessment   1. Type 1 diabetes mellitus with hypoglycemia and without coma (CMS/HCC)      Hospital decreased Lantus to 10units,  Humalog 2 units TID w/ meals plus mod.dose SSI.    now. Will give 1x 10 units Humalog now. Add SSI coverage at HS.      2. Anemia due to acute blood loss      Hgb downtrending slowly, 7.6 on 5/24. Repeat CBC 5/30/23.      3. 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 Transaminitis      Uptrending: ALT 60, AST 59. total bili and alk phos WNL. Will repeat CMP and PT/PTT 5/30/23.      5. Stage 3b chronic kidney disease        6. Chronic diastolic congestive heart failure (CMS/HCC)      Currently euvolemic after 5-day addition of metolazone. Continue Torsemide 100mg QD.      7. Other specified hypothyroidism      On levothyroxine 200mcg. Repeat TSH and free T4 in 4 weeks.      8. Paroxysmal atrial fibrillation (CMS/HCC)      Rate controlled on bblocker. not on DOAC d/t ABLA. On carafate and protonix 40mg BID.      9. Essential hypertension      Currently well controlled. Continue bblocker, torsemide, spironolactone      10. History of transcatheter aortic valve replacement (TAVR)             Meds, orders, nursing notes reviewed.  Nurse will monitor and update physician of any change: Discussed with patient and provided information.  Supportive care will be given.  Dr. Recinos will follow-up next week.      Electronically Signed By: Angélica SINGH  EMILY Hammer-CNP   5/25/23  8:47 PM